# Patient Record
Sex: MALE | Race: WHITE | NOT HISPANIC OR LATINO | Employment: OTHER | URBAN - NONMETROPOLITAN AREA
[De-identification: names, ages, dates, MRNs, and addresses within clinical notes are randomized per-mention and may not be internally consistent; named-entity substitution may affect disease eponyms.]

---

## 2017-02-16 ENCOUNTER — HOSPITAL ENCOUNTER (OUTPATIENT)
Dept: ULTRASOUND IMAGING | Facility: HOSPITAL | Age: 62
Discharge: HOME | End: 2017-02-16
Admitting: NURSE PRACTITIONER

## 2017-02-16 DIAGNOSIS — R60.0 LOCALIZED EDEMA: ICD-10-CM

## 2017-02-16 PROCEDURE — 93971 EXTREMITY STUDY: CPT

## 2017-02-20 RX ORDER — ROSUVASTATIN CALCIUM 10 MG/1
10 TABLET, COATED ORAL DAILY
Qty: 30 TABLET | Refills: 11 | Status: SHIPPED | OUTPATIENT
Start: 2017-02-20 | End: 2017-04-20 | Stop reason: SDUPTHER

## 2017-02-21 RX ORDER — FUROSEMIDE 20 MG/1
TABLET ORAL
Qty: 30 TABLET | Refills: 3 | Status: SHIPPED | OUTPATIENT
Start: 2017-02-21 | End: 2017-04-20 | Stop reason: SDUPTHER

## 2017-04-20 RX ORDER — SPIRONOLACTONE 50 MG/1
50 TABLET, FILM COATED ORAL DAILY
Qty: 30 TABLET | Refills: 6 | Status: SHIPPED | OUTPATIENT
Start: 2017-04-20 | End: 2017-05-24

## 2017-04-20 RX ORDER — ISOSORBIDE MONONITRATE 30 MG/1
30 TABLET, EXTENDED RELEASE ORAL DAILY
Qty: 30 TABLET | Refills: 6 | Status: SHIPPED | OUTPATIENT
Start: 2017-04-20 | End: 2018-06-25 | Stop reason: SDUPTHER

## 2017-04-20 RX ORDER — ROSUVASTATIN CALCIUM 10 MG/1
10 TABLET, COATED ORAL DAILY
Qty: 30 TABLET | Refills: 11 | Status: SHIPPED | OUTPATIENT
Start: 2017-04-20 | End: 2017-10-30

## 2017-04-20 RX ORDER — CLOPIDOGREL BISULFATE 75 MG/1
75 TABLET ORAL DAILY
Qty: 30 TABLET | Refills: 6 | Status: SHIPPED | OUTPATIENT
Start: 2017-04-20 | End: 2017-10-30 | Stop reason: SDUPTHER

## 2017-04-20 RX ORDER — ISOSORBIDE MONONITRATE 30 MG/1
TABLET, EXTENDED RELEASE ORAL
Qty: 30 TABLET | Refills: 6 | Status: SHIPPED | OUTPATIENT
Start: 2017-04-20 | End: 2017-04-20

## 2017-04-20 RX ORDER — LOSARTAN POTASSIUM AND HYDROCHLOROTHIAZIDE 25; 100 MG/1; MG/1
TABLET ORAL
Qty: 30 TABLET | Refills: 6 | Status: SHIPPED | OUTPATIENT
Start: 2017-04-20 | End: 2017-04-20 | Stop reason: SDUPTHER

## 2017-04-20 RX ORDER — CARVEDILOL 12.5 MG/1
12.5 TABLET ORAL 2 TIMES DAILY
Qty: 60 TABLET | Refills: 6 | Status: SHIPPED | OUTPATIENT
Start: 2017-04-20 | End: 2017-05-16 | Stop reason: HOSPADM

## 2017-04-20 RX ORDER — FUROSEMIDE 20 MG/1
20 TABLET ORAL DAILY
Qty: 30 TABLET | Refills: 6 | Status: SHIPPED | OUTPATIENT
Start: 2017-04-20 | End: 2018-06-25 | Stop reason: SDUPTHER

## 2017-04-20 RX ORDER — LOSARTAN POTASSIUM AND HYDROCHLOROTHIAZIDE 25; 100 MG/1; MG/1
1 TABLET ORAL DAILY
Qty: 30 TABLET | Refills: 6 | Status: SHIPPED | OUTPATIENT
Start: 2017-04-20 | End: 2018-06-25 | Stop reason: SDUPTHER

## 2017-04-20 NOTE — TELEPHONE ENCOUNTER
Refill Request came in by fax from   [facility] - [place], [place] - [address] - [phone]  - [phone] [facility] [address]  [place] [address]  Phone: [phone] Fax: [phone]   Refill sent to pharmacy via e-scribe.

## 2017-05-13 ENCOUNTER — APPOINTMENT (OUTPATIENT)
Dept: GENERAL RADIOLOGY | Facility: HOSPITAL | Age: 62
End: 2017-05-13

## 2017-05-13 ENCOUNTER — HOSPITAL ENCOUNTER (INPATIENT)
Facility: HOSPITAL | Age: 62
LOS: 3 days | Discharge: HOME | End: 2017-05-16
Attending: EMERGENCY MEDICINE | Admitting: INTERNAL MEDICINE

## 2017-05-13 DIAGNOSIS — R55 SYNCOPE, UNSPECIFIED SYNCOPE TYPE: ICD-10-CM

## 2017-05-13 DIAGNOSIS — N28.9 ACUTE RENAL INSUFFICIENCY: ICD-10-CM

## 2017-05-13 DIAGNOSIS — J18.9 PNEUMONIA OF RIGHT MIDDLE LOBE DUE TO INFECTIOUS ORGANISM: Primary | ICD-10-CM

## 2017-05-13 DIAGNOSIS — I95.9 HYPOTENSION, UNSPECIFIED HYPOTENSION TYPE: ICD-10-CM

## 2017-05-13 LAB
ALBUMIN SERPL-MCNC: 4 G/DL (ref 3.4–4.8)
ALBUMIN/GLOB SERPL: 1.3 G/DL (ref 1.1–1.8)
ALP SERPL-CCNC: 54 U/L (ref 38–126)
ALT SERPL W P-5'-P-CCNC: 37 U/L (ref 21–72)
ANION GAP SERPL CALCULATED.3IONS-SCNC: 13 MMOL/L (ref 5–15)
AST SERPL-CCNC: 30 U/L (ref 17–59)
BASOPHILS # BLD AUTO: 0.03 10*3/MM3 (ref 0–0.2)
BASOPHILS NFR BLD AUTO: 0.2 % (ref 0–2)
BILIRUB SERPL-MCNC: 1.5 MG/DL (ref 0.2–1.3)
BUN BLD-MCNC: 17 MG/DL (ref 7–21)
BUN/CREAT SERPL: 12.3 (ref 7–25)
CALCIUM SPEC-SCNC: 9.9 MG/DL (ref 8.4–10.2)
CHLORIDE SERPL-SCNC: 97 MMOL/L (ref 95–110)
CK MB SERPL-CCNC: 1.84 NG/ML (ref 0–5)
CK SERPL-CCNC: 216 U/L (ref 55–170)
CO2 SERPL-SCNC: 22 MMOL/L (ref 22–31)
CREAT BLD-MCNC: 1.38 MG/DL (ref 0.7–1.3)
D-LACTATE SERPL-SCNC: 3 MMOL/L (ref 0.5–2)
D-LACTATE SERPL-SCNC: 4.6 MMOL/L (ref 0.5–2)
DEPRECATED RDW RBC AUTO: 44.1 FL (ref 35.1–43.9)
EOSINOPHIL # BLD AUTO: 0.01 10*3/MM3 (ref 0–0.7)
EOSINOPHIL NFR BLD AUTO: 0.1 % (ref 0–7)
ERYTHROCYTE [DISTWIDTH] IN BLOOD BY AUTOMATED COUNT: 13.5 % (ref 11.5–14.5)
GFR SERPL CREATININE-BSD FRML MDRD: 52 ML/MIN/1.73 (ref 60–113)
GLOBULIN UR ELPH-MCNC: 3.1 GM/DL (ref 2.3–3.5)
GLUCOSE BLD-MCNC: 225 MG/DL (ref 60–100)
GLUCOSE BLDC GLUCOMTR-MCNC: 164 MG/DL (ref 70–130)
HCT VFR BLD AUTO: 37.5 % (ref 39–49)
HGB BLD-MCNC: 12.8 G/DL (ref 13.7–17.3)
HOLD SPECIMEN: NORMAL
HOLD SPECIMEN: NORMAL
IMM GRANULOCYTES # BLD: 0.06 10*3/MM3 (ref 0–0.02)
IMM GRANULOCYTES NFR BLD: 0.4 % (ref 0–0.5)
INR PPP: 1.28 (ref 0.8–1.2)
LYMPHOCYTES # BLD AUTO: 1.33 10*3/MM3 (ref 0.6–4.2)
LYMPHOCYTES NFR BLD AUTO: 8.8 % (ref 10–50)
MCH RBC QN AUTO: 30.6 PG (ref 26.5–34)
MCHC RBC AUTO-ENTMCNC: 34.1 G/DL (ref 31.5–36.3)
MCV RBC AUTO: 89.7 FL (ref 80–98)
MONOCYTES # BLD AUTO: 1.4 10*3/MM3 (ref 0–0.9)
MONOCYTES NFR BLD AUTO: 9.3 % (ref 0–12)
NEUTROPHILS # BLD AUTO: 12.28 10*3/MM3 (ref 2–8.6)
NEUTROPHILS NFR BLD AUTO: 81.2 % (ref 37–80)
NT-PROBNP SERPL-MCNC: 653 PG/ML (ref 0–900)
PLATELET # BLD AUTO: 159 10*3/MM3 (ref 150–450)
PMV BLD AUTO: 9 FL (ref 8–12)
POTASSIUM BLD-SCNC: 4.5 MMOL/L (ref 3.5–5.1)
PROT SERPL-MCNC: 7.1 G/DL (ref 6.3–8.6)
PROTHROMBIN TIME: 16 SECONDS (ref 11.1–15.3)
RBC # BLD AUTO: 4.18 10*6/MM3 (ref 4.37–5.74)
SODIUM BLD-SCNC: 132 MMOL/L (ref 137–145)
TROPONIN I SERPL-MCNC: 0.02 NG/ML
TROPONIN I SERPL-MCNC: 0.02 NG/ML
WBC NRBC COR # BLD: 15.11 10*3/MM3 (ref 3.2–9.8)
WHOLE BLOOD HOLD SPECIMEN: NORMAL
WHOLE BLOOD HOLD SPECIMEN: NORMAL

## 2017-05-13 PROCEDURE — 82962 GLUCOSE BLOOD TEST: CPT

## 2017-05-13 PROCEDURE — 83605 ASSAY OF LACTIC ACID: CPT | Performed by: INTERNAL MEDICINE

## 2017-05-13 PROCEDURE — 82550 ASSAY OF CK (CPK): CPT | Performed by: EMERGENCY MEDICINE

## 2017-05-13 PROCEDURE — 84484 ASSAY OF TROPONIN QUANT: CPT | Performed by: INTERNAL MEDICINE

## 2017-05-13 PROCEDURE — 63710000001 INSULIN ASPART PER 5 UNITS: Performed by: INTERNAL MEDICINE

## 2017-05-13 PROCEDURE — 87040 BLOOD CULTURE FOR BACTERIA: CPT | Performed by: EMERGENCY MEDICINE

## 2017-05-13 PROCEDURE — 71010 HC CHEST PA OR AP: CPT

## 2017-05-13 PROCEDURE — 25010000002 AZITHROMYCIN: Performed by: EMERGENCY MEDICINE

## 2017-05-13 PROCEDURE — 85610 PROTHROMBIN TIME: CPT | Performed by: EMERGENCY MEDICINE

## 2017-05-13 PROCEDURE — 93010 ELECTROCARDIOGRAM REPORT: CPT | Performed by: INTERNAL MEDICINE

## 2017-05-13 PROCEDURE — 82553 CREATINE MB FRACTION: CPT | Performed by: EMERGENCY MEDICINE

## 2017-05-13 PROCEDURE — 93005 ELECTROCARDIOGRAM TRACING: CPT | Performed by: EMERGENCY MEDICINE

## 2017-05-13 PROCEDURE — 99284 EMERGENCY DEPT VISIT MOD MDM: CPT

## 2017-05-13 PROCEDURE — 83605 ASSAY OF LACTIC ACID: CPT | Performed by: EMERGENCY MEDICINE

## 2017-05-13 PROCEDURE — 80053 COMPREHEN METABOLIC PANEL: CPT | Performed by: EMERGENCY MEDICINE

## 2017-05-13 PROCEDURE — 25010000002 CEFTRIAXONE: Performed by: EMERGENCY MEDICINE

## 2017-05-13 PROCEDURE — 83880 ASSAY OF NATRIURETIC PEPTIDE: CPT | Performed by: EMERGENCY MEDICINE

## 2017-05-13 PROCEDURE — 84484 ASSAY OF TROPONIN QUANT: CPT | Performed by: EMERGENCY MEDICINE

## 2017-05-13 PROCEDURE — 85025 COMPLETE CBC W/AUTO DIFF WBC: CPT | Performed by: EMERGENCY MEDICINE

## 2017-05-13 RX ORDER — CLOPIDOGREL BISULFATE 75 MG/1
75 TABLET ORAL DAILY
Status: DISCONTINUED | OUTPATIENT
Start: 2017-05-14 | End: 2017-05-16 | Stop reason: HOSPADM

## 2017-05-13 RX ORDER — ZOLPIDEM TARTRATE 5 MG/1
5 TABLET ORAL NIGHTLY PRN
Status: DISCONTINUED | OUTPATIENT
Start: 2017-05-13 | End: 2017-05-16 | Stop reason: HOSPADM

## 2017-05-13 RX ORDER — ACETAMINOPHEN 325 MG/1
650 TABLET ORAL EVERY 6 HOURS PRN
Status: DISCONTINUED | OUTPATIENT
Start: 2017-05-13 | End: 2017-05-16 | Stop reason: HOSPADM

## 2017-05-13 RX ORDER — ATORVASTATIN CALCIUM 20 MG/1
20 TABLET, FILM COATED ORAL DAILY
Status: DISCONTINUED | OUTPATIENT
Start: 2017-05-13 | End: 2017-05-16 | Stop reason: HOSPADM

## 2017-05-13 RX ORDER — ISOSORBIDE MONONITRATE 30 MG/1
30 TABLET, EXTENDED RELEASE ORAL DAILY
Status: DISCONTINUED | OUTPATIENT
Start: 2017-05-14 | End: 2017-05-16 | Stop reason: HOSPADM

## 2017-05-13 RX ORDER — FUROSEMIDE 20 MG/1
20 TABLET ORAL DAILY
Status: DISCONTINUED | OUTPATIENT
Start: 2017-05-13 | End: 2017-05-16 | Stop reason: HOSPADM

## 2017-05-13 RX ORDER — IPRATROPIUM BROMIDE AND ALBUTEROL SULFATE 2.5; .5 MG/3ML; MG/3ML
3 SOLUTION RESPIRATORY (INHALATION) EVERY 4 HOURS PRN
Status: DISCONTINUED | OUTPATIENT
Start: 2017-05-13 | End: 2017-05-16 | Stop reason: HOSPADM

## 2017-05-13 RX ORDER — SODIUM CHLORIDE 0.9 % (FLUSH) 0.9 %
10 SYRINGE (ML) INJECTION AS NEEDED
Status: DISCONTINUED | OUTPATIENT
Start: 2017-05-13 | End: 2017-05-16 | Stop reason: HOSPADM

## 2017-05-13 RX ORDER — SPIRONOLACTONE 50 MG/1
50 TABLET, FILM COATED ORAL DAILY
Status: DISCONTINUED | OUTPATIENT
Start: 2017-05-13 | End: 2017-05-14

## 2017-05-13 RX ORDER — DEXTROSE MONOHYDRATE 25 G/50ML
25 INJECTION, SOLUTION INTRAVENOUS
Status: DISCONTINUED | OUTPATIENT
Start: 2017-05-13 | End: 2017-05-16 | Stop reason: HOSPADM

## 2017-05-13 RX ORDER — ONDANSETRON 2 MG/ML
4 INJECTION INTRAMUSCULAR; INTRAVENOUS EVERY 6 HOURS PRN
Status: DISCONTINUED | OUTPATIENT
Start: 2017-05-13 | End: 2017-05-16 | Stop reason: HOSPADM

## 2017-05-13 RX ORDER — NICOTINE POLACRILEX 4 MG
15 LOZENGE BUCCAL
Status: DISCONTINUED | OUTPATIENT
Start: 2017-05-13 | End: 2017-05-16 | Stop reason: HOSPADM

## 2017-05-13 RX ORDER — CARVEDILOL 12.5 MG/1
12.5 TABLET ORAL 2 TIMES DAILY
Status: DISCONTINUED | OUTPATIENT
Start: 2017-05-13 | End: 2017-05-14

## 2017-05-13 RX ADMIN — AZITHROMYCIN 500 MG: 500 INJECTION, POWDER, LYOPHILIZED, FOR SOLUTION INTRAVENOUS at 15:05

## 2017-05-13 RX ADMIN — ZOLPIDEM TARTRATE 5 MG: 5 TABLET, FILM COATED ORAL at 22:02

## 2017-05-13 RX ADMIN — SODIUM CHLORIDE 1000 ML: 900 INJECTION, SOLUTION INTRAVENOUS at 10:16

## 2017-05-13 RX ADMIN — CEFTRIAXONE 1 G: 1 INJECTION, POWDER, FOR SOLUTION INTRAMUSCULAR; INTRAVENOUS at 10:45

## 2017-05-13 RX ADMIN — INSULIN ASPART 2 UNITS: 100 INJECTION, SOLUTION INTRAVENOUS; SUBCUTANEOUS at 20:47

## 2017-05-13 RX ADMIN — ATORVASTATIN CALCIUM 20 MG: 20 TABLET, FILM COATED ORAL at 17:45

## 2017-05-13 RX ADMIN — CARVEDILOL 12.5 MG: 12.5 TABLET, FILM COATED ORAL at 17:45

## 2017-05-13 RX ADMIN — SODIUM CHLORIDE 1000 ML: 9 INJECTION, SOLUTION INTRAVENOUS at 09:31

## 2017-05-13 RX ADMIN — SPIRONOLACTONE 50 MG: 50 TABLET, FILM COATED ORAL at 17:45

## 2017-05-13 RX ADMIN — FUROSEMIDE 20 MG: 20 TABLET ORAL at 17:45

## 2017-05-13 RX ADMIN — ACETAMINOPHEN 650 MG: 325 TABLET ORAL at 17:45

## 2017-05-14 ENCOUNTER — APPOINTMENT (OUTPATIENT)
Dept: CARDIOLOGY | Facility: HOSPITAL | Age: 62
End: 2017-05-14
Attending: INTERNAL MEDICINE

## 2017-05-14 ENCOUNTER — APPOINTMENT (OUTPATIENT)
Dept: MRI IMAGING | Facility: HOSPITAL | Age: 62
End: 2017-05-14

## 2017-05-14 LAB
ALBUMIN SERPL-MCNC: 3.7 G/DL (ref 3.4–4.8)
ALBUMIN/GLOB SERPL: 1.1 G/DL (ref 1.1–1.8)
ALP SERPL-CCNC: 46 U/L (ref 38–126)
ALT SERPL W P-5'-P-CCNC: 39 U/L (ref 21–72)
ANION GAP SERPL CALCULATED.3IONS-SCNC: 14 MMOL/L (ref 5–15)
AST SERPL-CCNC: 42 U/L (ref 17–59)
BACTERIA UR QL AUTO: ABNORMAL /HPF
BASOPHILS # BLD AUTO: 0.02 10*3/MM3 (ref 0–0.2)
BASOPHILS NFR BLD AUTO: 0.2 % (ref 0–2)
BH CV ECHO MEAS - ACS: 2.4 CM
BH CV ECHO MEAS - AO MAX PG (FULL): 3.6 MMHG
BH CV ECHO MEAS - AO MAX PG: 9.5 MMHG
BH CV ECHO MEAS - AO MEAN PG (FULL): 2.7 MMHG
BH CV ECHO MEAS - AO MEAN PG: 6 MMHG
BH CV ECHO MEAS - AO ROOT AREA: 12.5 CM^2
BH CV ECHO MEAS - AO ROOT DIAM: 4 CM
BH CV ECHO MEAS - AO V2 MAX: 154.2 CM/SEC
BH CV ECHO MEAS - AO V2 MEAN: 116.8 CM/SEC
BH CV ECHO MEAS - AO V2 VTI: 24.7 CM
BH CV ECHO MEAS - AVA(I,A): 5.7 CM^2
BH CV ECHO MEAS - AVA(I,D): 5.7 CM^2
BH CV ECHO MEAS - AVA(V,A): 5.8 CM^2
BH CV ECHO MEAS - AVA(V,D): 5.8 CM^2
BH CV ECHO MEAS - EDV(CUBED): 171.3 ML
BH CV ECHO MEAS - EDV(TEICH): 150.7 ML
BH CV ECHO MEAS - EF(CUBED): 70.2 %
BH CV ECHO MEAS - EF(TEICH): 61.2 %
BH CV ECHO MEAS - ESV(CUBED): 51 ML
BH CV ECHO MEAS - ESV(TEICH): 58.5 ML
BH CV ECHO MEAS - FS: 33.2 %
BH CV ECHO MEAS - IVS/LVPW: 1
BH CV ECHO MEAS - IVSD: 1.2 CM
BH CV ECHO MEAS - LA DIMENSION: 4.1 CM
BH CV ECHO MEAS - LA/AO: 1
BH CV ECHO MEAS - LV MASS(C)D: 280.2 GRAMS
BH CV ECHO MEAS - LV MAX PG: 5.9 MMHG
BH CV ECHO MEAS - LV MEAN PG: 3.3 MMHG
BH CV ECHO MEAS - LV V1 MAX: 121.7 CM/SEC
BH CV ECHO MEAS - LV V1 MEAN: 83.4 CM/SEC
BH CV ECHO MEAS - LV V1 VTI: 19.4 CM
BH CV ECHO MEAS - LVIDD: 5.6 CM
BH CV ECHO MEAS - LVIDS: 3.7 CM
BH CV ECHO MEAS - LVOT AREA (M): 7.1 CM^2
BH CV ECHO MEAS - LVOT AREA: 7.3 CM^2
BH CV ECHO MEAS - LVOT DIAM: 3 CM
BH CV ECHO MEAS - LVPWD: 1.2 CM
BH CV ECHO MEAS - MV A MAX VEL: 68.6 CM/SEC
BH CV ECHO MEAS - MV DEC SLOPE: 550.8 CM/SEC^2
BH CV ECHO MEAS - MV E MAX VEL: 87.4 CM/SEC
BH CV ECHO MEAS - MV E/A: 1.3
BH CV ECHO MEAS - MV P1/2T MAX VEL: 105.2 CM/SEC
BH CV ECHO MEAS - MV P1/2T: 55.9 MSEC
BH CV ECHO MEAS - MVA P1/2T LCG: 2.1 CM^2
BH CV ECHO MEAS - MVA(P1/2T): 3.9 CM^2
BH CV ECHO MEAS - PA MAX PG: 3.8 MMHG
BH CV ECHO MEAS - PA V2 MAX: 97.9 CM/SEC
BH CV ECHO MEAS - RAP SYSTOLE: 10 MMHG
BH CV ECHO MEAS - RVDD: 2.7 CM
BH CV ECHO MEAS - SV(AO): 309.7 ML
BH CV ECHO MEAS - SV(CUBED): 120.3 ML
BH CV ECHO MEAS - SV(LVOT): 141.4 ML
BH CV ECHO MEAS - SV(TEICH): 92.3 ML
BILIRUB SERPL-MCNC: 1.3 MG/DL (ref 0.2–1.3)
BILIRUB UR QL STRIP: ABNORMAL
BUN BLD-MCNC: 25 MG/DL (ref 7–21)
BUN/CREAT SERPL: 17.1 (ref 7–25)
CALCIUM SPEC-SCNC: 9.2 MG/DL (ref 8.4–10.2)
CHLORIDE SERPL-SCNC: 100 MMOL/L (ref 95–110)
CLARITY UR: CLEAR
CO2 SERPL-SCNC: 20 MMOL/L (ref 22–31)
COARSE GRAN CASTS URNS QL MICRO: ABNORMAL /LPF
COLOR UR: ABNORMAL
CREAT BLD-MCNC: 1.46 MG/DL (ref 0.7–1.3)
D-LACTATE SERPL-SCNC: 2.9 MMOL/L (ref 0.5–2)
DEPRECATED RDW RBC AUTO: 44 FL (ref 35.1–43.9)
EOSINOPHIL # BLD AUTO: 0 10*3/MM3 (ref 0–0.7)
EOSINOPHIL NFR BLD AUTO: 0 % (ref 0–7)
ERYTHROCYTE [DISTWIDTH] IN BLOOD BY AUTOMATED COUNT: 13.5 % (ref 11.5–14.5)
GFR SERPL CREATININE-BSD FRML MDRD: 49 ML/MIN/1.73 (ref 60–113)
GLOBULIN UR ELPH-MCNC: 3.3 GM/DL (ref 2.3–3.5)
GLUCOSE BLD-MCNC: 169 MG/DL (ref 60–100)
GLUCOSE BLDC GLUCOMTR-MCNC: 195 MG/DL (ref 70–130)
GLUCOSE BLDC GLUCOMTR-MCNC: 203 MG/DL (ref 70–130)
GLUCOSE BLDC GLUCOMTR-MCNC: 205 MG/DL (ref 70–130)
GLUCOSE UR STRIP-MCNC: NEGATIVE MG/DL
HCT VFR BLD AUTO: 37.7 % (ref 39–49)
HGB BLD-MCNC: 12.8 G/DL (ref 13.7–17.3)
HGB UR QL STRIP.AUTO: ABNORMAL
HYALINE CASTS UR QL AUTO: ABNORMAL /LPF
IMM GRANULOCYTES # BLD: 0.06 10*3/MM3 (ref 0–0.02)
IMM GRANULOCYTES NFR BLD: 0.5 % (ref 0–0.5)
KETONES UR QL STRIP: ABNORMAL
LEUKOCYTE ESTERASE UR QL STRIP.AUTO: ABNORMAL
LV EF 2D ECHO EST: 57 %
LYMPHOCYTES # BLD AUTO: 1.04 10*3/MM3 (ref 0.6–4.2)
LYMPHOCYTES NFR BLD AUTO: 8.9 % (ref 10–50)
MCH RBC QN AUTO: 30.6 PG (ref 26.5–34)
MCHC RBC AUTO-ENTMCNC: 34 G/DL (ref 31.5–36.3)
MCV RBC AUTO: 90.2 FL (ref 80–98)
MONOCYTES # BLD AUTO: 0.67 10*3/MM3 (ref 0–0.9)
MONOCYTES NFR BLD AUTO: 5.7 % (ref 0–12)
NEUTROPHILS # BLD AUTO: 9.91 10*3/MM3 (ref 2–8.6)
NEUTROPHILS NFR BLD AUTO: 84.7 % (ref 37–80)
NITRITE UR QL STRIP: NEGATIVE
PH UR STRIP.AUTO: 5.5 [PH] (ref 5–9)
PLATELET # BLD AUTO: 138 10*3/MM3 (ref 150–450)
PMV BLD AUTO: 9.9 FL (ref 8–12)
POTASSIUM BLD-SCNC: 4.1 MMOL/L (ref 3.5–5.1)
PROT SERPL-MCNC: 7 G/DL (ref 6.3–8.6)
PROT UR QL STRIP: ABNORMAL
RBC # BLD AUTO: 4.18 10*6/MM3 (ref 4.37–5.74)
RBC # UR: ABNORMAL /HPF
REF LAB TEST METHOD: ABNORMAL
SODIUM BLD-SCNC: 134 MMOL/L (ref 137–145)
SP GR UR STRIP: 1.02 (ref 1–1.03)
SQUAMOUS #/AREA URNS HPF: ABNORMAL /HPF
TROPONIN I SERPL-MCNC: 0.02 NG/ML
TROPONIN I SERPL-MCNC: 0.02 NG/ML
TROPONIN I SERPL-MCNC: 0.04 NG/ML
UROBILINOGEN UR QL STRIP: ABNORMAL
WBC CASTS #/AREA URNS LPF: ABNORMAL /LPF
WBC NRBC COR # BLD: 11.7 10*3/MM3 (ref 3.2–9.8)
WBC UR QL AUTO: ABNORMAL /HPF

## 2017-05-14 PROCEDURE — 83605 ASSAY OF LACTIC ACID: CPT | Performed by: NURSE PRACTITIONER

## 2017-05-14 PROCEDURE — 85025 COMPLETE CBC W/AUTO DIFF WBC: CPT | Performed by: INTERNAL MEDICINE

## 2017-05-14 PROCEDURE — 63710000001 INSULIN ASPART PER 5 UNITS: Performed by: INTERNAL MEDICINE

## 2017-05-14 PROCEDURE — 93306 TTE W/DOPPLER COMPLETE: CPT

## 2017-05-14 PROCEDURE — 81001 URINALYSIS AUTO W/SCOPE: CPT | Performed by: EMERGENCY MEDICINE

## 2017-05-14 PROCEDURE — 87086 URINE CULTURE/COLONY COUNT: CPT | Performed by: EMERGENCY MEDICINE

## 2017-05-14 PROCEDURE — 99253 IP/OBS CNSLTJ NEW/EST LOW 45: CPT | Performed by: INTERNAL MEDICINE

## 2017-05-14 PROCEDURE — 25010000002 AZITHROMYCIN: Performed by: INTERNAL MEDICINE

## 2017-05-14 PROCEDURE — 82962 GLUCOSE BLOOD TEST: CPT

## 2017-05-14 PROCEDURE — 93306 TTE W/DOPPLER COMPLETE: CPT | Performed by: INTERNAL MEDICINE

## 2017-05-14 PROCEDURE — 80053 COMPREHEN METABOLIC PANEL: CPT | Performed by: INTERNAL MEDICINE

## 2017-05-14 PROCEDURE — 25010000002 CEFTRIAXONE: Performed by: INTERNAL MEDICINE

## 2017-05-14 PROCEDURE — 84484 ASSAY OF TROPONIN QUANT: CPT | Performed by: INTERNAL MEDICINE

## 2017-05-14 RX ORDER — CARVEDILOL 6.25 MG/1
6.25 TABLET ORAL 2 TIMES DAILY
Status: DISCONTINUED | OUTPATIENT
Start: 2017-05-14 | End: 2017-05-16 | Stop reason: HOSPADM

## 2017-05-14 RX ADMIN — INSULIN ASPART 2 UNITS: 100 INJECTION, SOLUTION INTRAVENOUS; SUBCUTANEOUS at 17:30

## 2017-05-14 RX ADMIN — ISOSORBIDE MONONITRATE 30 MG: 30 TABLET, EXTENDED RELEASE ORAL at 08:41

## 2017-05-14 RX ADMIN — ATORVASTATIN CALCIUM 20 MG: 20 TABLET, FILM COATED ORAL at 10:18

## 2017-05-14 RX ADMIN — ZOLPIDEM TARTRATE 5 MG: 5 TABLET, FILM COATED ORAL at 20:24

## 2017-05-14 RX ADMIN — Medication 10 ML: at 20:29

## 2017-05-14 RX ADMIN — ACETAMINOPHEN 650 MG: 325 TABLET ORAL at 08:41

## 2017-05-14 RX ADMIN — CEFTRIAXONE 1 G: 1 INJECTION, POWDER, FOR SOLUTION INTRAMUSCULAR; INTRAVENOUS at 13:31

## 2017-05-14 RX ADMIN — CLOPIDOGREL BISULFATE 75 MG: 75 TABLET ORAL at 08:41

## 2017-05-14 RX ADMIN — AZITHROMYCIN 500 MG: 500 INJECTION, POWDER, LYOPHILIZED, FOR SOLUTION INTRAVENOUS at 15:35

## 2017-05-14 RX ADMIN — INSULIN ASPART 2 UNITS: 100 INJECTION, SOLUTION INTRAVENOUS; SUBCUTANEOUS at 08:43

## 2017-05-14 RX ADMIN — INSULIN ASPART 2 UNITS: 100 INJECTION, SOLUTION INTRAVENOUS; SUBCUTANEOUS at 12:00

## 2017-05-14 RX ADMIN — CARVEDILOL 6.25 MG: 12.5 TABLET, FILM COATED ORAL at 17:30

## 2017-05-14 RX ADMIN — CARVEDILOL 6.25 MG: 12.5 TABLET, FILM COATED ORAL at 08:41

## 2017-05-14 RX ADMIN — INSULIN ASPART 3 UNITS: 100 INJECTION, SOLUTION INTRAVENOUS; SUBCUTANEOUS at 20:24

## 2017-05-14 RX ADMIN — FUROSEMIDE 20 MG: 20 TABLET ORAL at 08:41

## 2017-05-15 LAB
ALBUMIN SERPL-MCNC: 3.4 G/DL (ref 3.4–4.8)
ALBUMIN/GLOB SERPL: 1 G/DL (ref 1.1–1.8)
ALP SERPL-CCNC: 56 U/L (ref 38–126)
ALT SERPL W P-5'-P-CCNC: 45 U/L (ref 21–72)
ANION GAP SERPL CALCULATED.3IONS-SCNC: 13 MMOL/L (ref 5–15)
AST SERPL-CCNC: 57 U/L (ref 17–59)
BASOPHILS # BLD AUTO: 0.03 10*3/MM3 (ref 0–0.2)
BASOPHILS NFR BLD AUTO: 0.3 % (ref 0–2)
BILIRUB SERPL-MCNC: 0.8 MG/DL (ref 0.2–1.3)
BUN BLD-MCNC: 26 MG/DL (ref 7–21)
BUN/CREAT SERPL: 21.5 (ref 7–25)
CALCIUM SPEC-SCNC: 9.3 MG/DL (ref 8.4–10.2)
CHLORIDE SERPL-SCNC: 101 MMOL/L (ref 95–110)
CO2 SERPL-SCNC: 23 MMOL/L (ref 22–31)
CREAT BLD-MCNC: 1.21 MG/DL (ref 0.7–1.3)
DEPRECATED RDW RBC AUTO: 45.7 FL (ref 35.1–43.9)
EOSINOPHIL # BLD AUTO: 0.01 10*3/MM3 (ref 0–0.7)
EOSINOPHIL NFR BLD AUTO: 0.1 % (ref 0–7)
ERYTHROCYTE [DISTWIDTH] IN BLOOD BY AUTOMATED COUNT: 13.9 % (ref 11.5–14.5)
GFR SERPL CREATININE-BSD FRML MDRD: 61 ML/MIN/1.73
GLOBULIN UR ELPH-MCNC: 3.3 GM/DL (ref 2.3–3.5)
GLUCOSE BLD-MCNC: 159 MG/DL (ref 60–100)
GLUCOSE BLDC GLUCOMTR-MCNC: 145 MG/DL (ref 70–130)
GLUCOSE BLDC GLUCOMTR-MCNC: 155 MG/DL (ref 70–130)
GLUCOSE BLDC GLUCOMTR-MCNC: 168 MG/DL (ref 70–130)
GLUCOSE BLDC GLUCOMTR-MCNC: 181 MG/DL (ref 70–130)
GLUCOSE BLDC GLUCOMTR-MCNC: 192 MG/DL (ref 70–130)
HCT VFR BLD AUTO: 36.9 % (ref 39–49)
HGB BLD-MCNC: 12.5 G/DL (ref 13.7–17.3)
IMM GRANULOCYTES # BLD: 0.04 10*3/MM3 (ref 0–0.02)
IMM GRANULOCYTES NFR BLD: 0.4 % (ref 0–0.5)
LYMPHOCYTES # BLD AUTO: 0.92 10*3/MM3 (ref 0.6–4.2)
LYMPHOCYTES NFR BLD AUTO: 9.1 % (ref 10–50)
MCH RBC QN AUTO: 30.6 PG (ref 26.5–34)
MCHC RBC AUTO-ENTMCNC: 33.9 G/DL (ref 31.5–36.3)
MCV RBC AUTO: 90.2 FL (ref 80–98)
MONOCYTES # BLD AUTO: 0.72 10*3/MM3 (ref 0–0.9)
MONOCYTES NFR BLD AUTO: 7.2 % (ref 0–12)
NEUTROPHILS # BLD AUTO: 8.34 10*3/MM3 (ref 2–8.6)
NEUTROPHILS NFR BLD AUTO: 82.9 % (ref 37–80)
NRBC BLD MANUAL-RTO: 0 /100 WBC (ref 0–0)
PLATELET # BLD AUTO: 147 10*3/MM3 (ref 150–450)
PMV BLD AUTO: 9.6 FL (ref 8–12)
POTASSIUM BLD-SCNC: 4 MMOL/L (ref 3.5–5.1)
PROT SERPL-MCNC: 6.7 G/DL (ref 6.3–8.6)
RBC # BLD AUTO: 4.09 10*6/MM3 (ref 4.37–5.74)
SODIUM BLD-SCNC: 137 MMOL/L (ref 137–145)
WBC NRBC COR # BLD: 10.06 10*3/MM3 (ref 3.2–9.8)

## 2017-05-15 PROCEDURE — 25010000002 AZITHROMYCIN: Performed by: INTERNAL MEDICINE

## 2017-05-15 PROCEDURE — 80053 COMPREHEN METABOLIC PANEL: CPT | Performed by: INTERNAL MEDICINE

## 2017-05-15 PROCEDURE — 63710000001 INSULIN ASPART PER 5 UNITS: Performed by: INTERNAL MEDICINE

## 2017-05-15 PROCEDURE — 25010000002 CEFTRIAXONE: Performed by: INTERNAL MEDICINE

## 2017-05-15 PROCEDURE — 82962 GLUCOSE BLOOD TEST: CPT

## 2017-05-15 PROCEDURE — 85025 COMPLETE CBC W/AUTO DIFF WBC: CPT | Performed by: INTERNAL MEDICINE

## 2017-05-15 RX ADMIN — AZITHROMYCIN 500 MG: 500 INJECTION, POWDER, LYOPHILIZED, FOR SOLUTION INTRAVENOUS at 15:23

## 2017-05-15 RX ADMIN — INSULIN ASPART 2 UNITS: 100 INJECTION, SOLUTION INTRAVENOUS; SUBCUTANEOUS at 18:22

## 2017-05-15 RX ADMIN — Medication 10 ML: at 11:23

## 2017-05-15 RX ADMIN — CLOPIDOGREL BISULFATE 75 MG: 75 TABLET ORAL at 08:47

## 2017-05-15 RX ADMIN — CEFTRIAXONE 1 G: 1 INJECTION, POWDER, FOR SOLUTION INTRAMUSCULAR; INTRAVENOUS at 11:23

## 2017-05-15 RX ADMIN — ZOLPIDEM TARTRATE 5 MG: 5 TABLET, FILM COATED ORAL at 20:27

## 2017-05-15 RX ADMIN — ATORVASTATIN CALCIUM 20 MG: 20 TABLET, FILM COATED ORAL at 08:47

## 2017-05-15 RX ADMIN — Medication 10 ML: at 12:51

## 2017-05-15 RX ADMIN — CARVEDILOL 6.25 MG: 12.5 TABLET, FILM COATED ORAL at 18:22

## 2017-05-15 RX ADMIN — INSULIN ASPART 2 UNITS: 100 INJECTION, SOLUTION INTRAVENOUS; SUBCUTANEOUS at 08:48

## 2017-05-15 RX ADMIN — CARVEDILOL 6.25 MG: 12.5 TABLET, FILM COATED ORAL at 08:47

## 2017-05-15 RX ADMIN — ISOSORBIDE MONONITRATE 30 MG: 30 TABLET, EXTENDED RELEASE ORAL at 08:47

## 2017-05-15 RX ADMIN — FUROSEMIDE 20 MG: 20 TABLET ORAL at 08:47

## 2017-05-15 RX ADMIN — INSULIN ASPART 2 UNITS: 100 INJECTION, SOLUTION INTRAVENOUS; SUBCUTANEOUS at 11:55

## 2017-05-16 VITALS
BODY MASS INDEX: 42.66 KG/M2 | WEIGHT: 315 LBS | HEART RATE: 75 BPM | DIASTOLIC BLOOD PRESSURE: 55 MMHG | RESPIRATION RATE: 18 BRPM | SYSTOLIC BLOOD PRESSURE: 106 MMHG | OXYGEN SATURATION: 92 % | TEMPERATURE: 98.8 F | HEIGHT: 72 IN

## 2017-05-16 LAB
ALBUMIN SERPL-MCNC: 3.4 G/DL (ref 3.4–4.8)
ALBUMIN/GLOB SERPL: 1 G/DL (ref 1.1–1.8)
ALP SERPL-CCNC: 71 U/L (ref 38–126)
ALT SERPL W P-5'-P-CCNC: 61 U/L (ref 21–72)
ANION GAP SERPL CALCULATED.3IONS-SCNC: 13 MMOL/L (ref 5–15)
AST SERPL-CCNC: 78 U/L (ref 17–59)
BACTERIA SPEC AEROBE CULT: NORMAL
BASOPHILS # BLD AUTO: 0.02 10*3/MM3 (ref 0–0.2)
BASOPHILS NFR BLD AUTO: 0.2 % (ref 0–2)
BILIRUB SERPL-MCNC: 1 MG/DL (ref 0.2–1.3)
BUN BLD-MCNC: 22 MG/DL (ref 7–21)
BUN/CREAT SERPL: 22.4 (ref 7–25)
CALCIUM SPEC-SCNC: 9.8 MG/DL (ref 8.4–10.2)
CHLORIDE SERPL-SCNC: 100 MMOL/L (ref 95–110)
CO2 SERPL-SCNC: 23 MMOL/L (ref 22–31)
CREAT BLD-MCNC: 0.98 MG/DL (ref 0.7–1.3)
DEPRECATED RDW RBC AUTO: 44.8 FL (ref 35.1–43.9)
EOSINOPHIL # BLD AUTO: 0.08 10*3/MM3 (ref 0–0.7)
EOSINOPHIL NFR BLD AUTO: 0.9 % (ref 0–7)
ERYTHROCYTE [DISTWIDTH] IN BLOOD BY AUTOMATED COUNT: 13.5 % (ref 11.5–14.5)
GFR SERPL CREATININE-BSD FRML MDRD: 77 ML/MIN/1.73 (ref 49–113)
GLOBULIN UR ELPH-MCNC: 3.5 GM/DL (ref 2.3–3.5)
GLUCOSE BLD-MCNC: 185 MG/DL (ref 60–100)
GLUCOSE BLDC GLUCOMTR-MCNC: 169 MG/DL (ref 70–130)
GLUCOSE BLDC GLUCOMTR-MCNC: 175 MG/DL (ref 70–130)
HCT VFR BLD AUTO: 36.3 % (ref 39–49)
HGB BLD-MCNC: 12.7 G/DL (ref 13.7–17.3)
IMM GRANULOCYTES # BLD: 0.05 10*3/MM3 (ref 0–0.02)
IMM GRANULOCYTES NFR BLD: 0.6 % (ref 0–0.5)
LYMPHOCYTES # BLD AUTO: 0.95 10*3/MM3 (ref 0.6–4.2)
LYMPHOCYTES NFR BLD AUTO: 10.7 % (ref 10–50)
MCH RBC QN AUTO: 31.5 PG (ref 26.5–34)
MCHC RBC AUTO-ENTMCNC: 35 G/DL (ref 31.5–36.3)
MCV RBC AUTO: 90.1 FL (ref 80–98)
MONOCYTES # BLD AUTO: 0.99 10*3/MM3 (ref 0–0.9)
MONOCYTES NFR BLD AUTO: 11.1 % (ref 0–12)
NEUTROPHILS # BLD AUTO: 6.79 10*3/MM3 (ref 2–8.6)
NEUTROPHILS NFR BLD AUTO: 76.5 % (ref 37–80)
PLATELET # BLD AUTO: 149 10*3/MM3 (ref 150–450)
PMV BLD AUTO: 9.9 FL (ref 8–12)
POTASSIUM BLD-SCNC: 3.9 MMOL/L (ref 3.5–5.1)
PROT SERPL-MCNC: 6.9 G/DL (ref 6.3–8.6)
RBC # BLD AUTO: 4.03 10*6/MM3 (ref 4.37–5.74)
SODIUM BLD-SCNC: 136 MMOL/L (ref 137–145)
WBC NRBC COR # BLD: 8.88 10*3/MM3 (ref 3.2–9.8)

## 2017-05-16 PROCEDURE — 85025 COMPLETE CBC W/AUTO DIFF WBC: CPT | Performed by: INTERNAL MEDICINE

## 2017-05-16 PROCEDURE — 82962 GLUCOSE BLOOD TEST: CPT

## 2017-05-16 PROCEDURE — 63710000001 INSULIN ASPART PER 5 UNITS: Performed by: INTERNAL MEDICINE

## 2017-05-16 PROCEDURE — 25010000002 CEFTRIAXONE: Performed by: INTERNAL MEDICINE

## 2017-05-16 PROCEDURE — 80053 COMPREHEN METABOLIC PANEL: CPT | Performed by: INTERNAL MEDICINE

## 2017-05-16 RX ORDER — AZITHROMYCIN 250 MG/1
TABLET, FILM COATED ORAL
Qty: 2 TABLET | Refills: 0 | Status: SHIPPED | OUTPATIENT
Start: 2017-05-16 | End: 2017-08-09

## 2017-05-16 RX ORDER — CEFUROXIME AXETIL 500 MG/1
500 TABLET ORAL 2 TIMES DAILY
Qty: 12 TABLET | Refills: 0 | Status: SHIPPED | OUTPATIENT
Start: 2017-05-16 | End: 2017-08-09

## 2017-05-16 RX ORDER — CARVEDILOL 6.25 MG/1
6.25 TABLET ORAL 2 TIMES DAILY
Qty: 30 TABLET | Refills: 0 | Status: SHIPPED | OUTPATIENT
Start: 2017-05-16 | End: 2017-05-24 | Stop reason: SDUPTHER

## 2017-05-16 RX ADMIN — ISOSORBIDE MONONITRATE 30 MG: 30 TABLET, EXTENDED RELEASE ORAL at 08:25

## 2017-05-16 RX ADMIN — Medication 10 ML: at 08:25

## 2017-05-16 RX ADMIN — INSULIN ASPART 2 UNITS: 100 INJECTION, SOLUTION INTRAVENOUS; SUBCUTANEOUS at 08:23

## 2017-05-16 RX ADMIN — ATORVASTATIN CALCIUM 20 MG: 20 TABLET, FILM COATED ORAL at 08:25

## 2017-05-16 RX ADMIN — CEFTRIAXONE 1 G: 1 INJECTION, POWDER, FOR SOLUTION INTRAMUSCULAR; INTRAVENOUS at 11:25

## 2017-05-16 RX ADMIN — CARVEDILOL 6.25 MG: 12.5 TABLET, FILM COATED ORAL at 08:25

## 2017-05-16 RX ADMIN — FUROSEMIDE 20 MG: 20 TABLET ORAL at 08:25

## 2017-05-16 RX ADMIN — INSULIN ASPART 2 UNITS: 100 INJECTION, SOLUTION INTRAVENOUS; SUBCUTANEOUS at 11:25

## 2017-05-16 RX ADMIN — CLOPIDOGREL BISULFATE 75 MG: 75 TABLET ORAL at 08:25

## 2017-05-16 RX ADMIN — Medication 10 ML: at 11:25

## 2017-05-18 LAB
BACTERIA SPEC AEROBE CULT: NORMAL
BACTERIA SPEC AEROBE CULT: NORMAL

## 2017-05-24 RX ORDER — CARVEDILOL 6.25 MG/1
6.25 TABLET ORAL DAILY
Qty: 30 TABLET | Refills: 0 | Status: SHIPPED | OUTPATIENT
Start: 2017-05-24 | End: 2017-07-27 | Stop reason: SDUPTHER

## 2017-07-27 RX ORDER — CARVEDILOL 6.25 MG/1
6.25 TABLET ORAL DAILY
Qty: 30 TABLET | Refills: 6 | Status: SHIPPED | OUTPATIENT
Start: 2017-07-27 | End: 2018-02-26 | Stop reason: SDUPTHER

## 2017-08-09 ENCOUNTER — OFFICE VISIT (OUTPATIENT)
Dept: CARDIOLOGY | Facility: CLINIC | Age: 62
End: 2017-08-09

## 2017-08-09 VITALS
DIASTOLIC BLOOD PRESSURE: 64 MMHG | HEIGHT: 72 IN | HEART RATE: 67 BPM | SYSTOLIC BLOOD PRESSURE: 138 MMHG | BODY MASS INDEX: 42.66 KG/M2 | WEIGHT: 315 LBS

## 2017-08-09 DIAGNOSIS — E78.2 MIXED HYPERLIPIDEMIA: ICD-10-CM

## 2017-08-09 DIAGNOSIS — I25.10 ATHEROSCLEROSIS OF NATIVE CORONARY ARTERY OF NATIVE HEART WITHOUT ANGINA PECTORIS: Primary | ICD-10-CM

## 2017-08-09 DIAGNOSIS — I25.2 OLD MYOCARDIAL INFARCTION: ICD-10-CM

## 2017-08-09 DIAGNOSIS — I10 ESSENTIAL HYPERTENSION: ICD-10-CM

## 2017-08-09 PROCEDURE — 99213 OFFICE O/P EST LOW 20 MIN: CPT | Performed by: INTERNAL MEDICINE

## 2017-08-09 RX ORDER — SPIRONOLACTONE 25 MG/1
25 TABLET ORAL DAILY
Qty: 30 TABLET | Refills: 6 | Status: SHIPPED | OUTPATIENT
Start: 2017-08-09 | End: 2018-03-26 | Stop reason: SDUPTHER

## 2017-08-09 RX ORDER — ASPIRIN 325 MG
325 TABLET ORAL DAILY
COMMUNITY

## 2017-08-09 NOTE — PROGRESS NOTES
Aramis Alvarez  61 y.o. male    08/09/2017  1. Atherosclerosis of native coronary artery of native heart without angina pectoris    2. Old myocardial infarction    3. Essential hypertension    4. Mixed hyperlipidemia        History of Present Illness    Mr. Alvarez is here for follow-up of his above stated problems.  He denied any chest pain, palpitation, dyspnea.  His predominant complaint was swelling of the lower extremities which has been getting worse since his hospital admission earlier this year.  He feels that he was taken off Spironolactone which has helped him in the past.  Blood pressure was in the normal range.  No signs of congestive heart failure was noted.  His LV systolic function is known to be normal.        SUBJECTIVE    No Known Allergies      Past Medical History:   Diagnosis Date   • Coronary atherosclerosis of native coronary artery    • Diabetes mellitus    • Hyperlipidemia    • Hypertension    • Obesity    • Old myocardial infarction          Past Surgical History:   Procedure Laterality Date   • CARDIAC SURGERY      pt states he had one stent put in         Family History   Problem Relation Age of Onset   • Depression Mother    • Diabetes Mother    • Drug abuse Mother    • Heart disease Mother    • Hyperlipidemia Mother    • Alcohol abuse Father    • Cancer Father    • COPD Father    • Hypertension Father    • Early death Brother          Social History     Social History   • Marital status:      Spouse name: N/A   • Number of children: N/A   • Years of education: N/A     Occupational History   • Not on file.     Social History Main Topics   • Smoking status: Former Smoker     Packs/day: 2.00     Years: 45.00     Quit date: 6/9/2011   • Smokeless tobacco: Never Used   • Alcohol use Not on file   • Drug use: No   • Sexual activity: Defer     Other Topics Concern   • Not on file     Social History Narrative         Current Outpatient Prescriptions   Medication Sig Dispense Refill   •  "aspirin 325 MG tablet Take 325 mg by mouth Daily.     • carvedilol (COREG) 6.25 MG tablet Take 1 tablet by mouth Daily. 30 tablet 6   • clopidogrel (PLAVIX) 75 MG tablet Take 1 tablet by mouth Daily. 30 tablet 6   • furosemide (LASIX) 20 MG tablet Take 1 tablet by mouth Daily. 30 tablet 6   • glimepiride (AMARYL) 1 MG tablet Take 1 tablet by mouth Daily.     • isosorbide mononitrate (IMDUR) 30 MG 24 hr tablet Take 1 tablet by mouth Daily. 30 tablet 6   • losartan-hydrochlorothiazide (HYZAAR) 100-25 MG per tablet Take 1 tablet by mouth Daily. 30 tablet 6   • metFORMIN (GLUCOPHAGE) 500 MG tablet Take 1 tablet by mouth 2 (Two) Times a Day.     • rosuvastatin (CRESTOR) 10 MG tablet Take 1 tablet by mouth Daily. Use generic 30 tablet 11   • vitamin D (ERGOCALCIFEROL) 74055 UNITS capsule capsule Take 1 capsule by mouth Every 30 (Thirty) Days.     • spironolactone (ALDACTONE) 25 MG tablet Take 1 tablet by mouth Daily. 30 tablet 6     No current facility-administered medications for this visit.          OBJECTIVE    /64  Pulse 67  Ht 71.5\" (181.6 cm)  Wt (!) 342 lb (155 kg)  BMI 47.03 kg/m2        Review of Systems     Constitutional:  Denies recent weight loss, weight gain, fever or chills    HENT:  Denies any hearing loss, epistaxis, hoarseness, or difficulty speaking.     Eyes: Wears eyeglasses or contact lenses     Respiratory:  Denies dyspnea with exertion,no cough, wheezing, or hemoptysis.     Cardiovascular: Negative for palpations, chest pain, orthopnea, PND. Has  edema in both lower extremities    Gastrointestinal:  Denies change in bowel habits, dyspepsia, ulcer disease, hematochezia, or melena.     Endocrine: Negative for cold intolerance, heat intolerance, polydipsia, polyphagia and polyuria.    Genitourinary: Negative.      Musculoskeletal: Denies any history of arthritic symptoms or back problems     Skin:  Denies any change in hair or nails, rashes, or skin lesions.     Allergic/Immunologic: " Negative.  Negative for environmental allergies, food allergies and immunocompromised state.     Neurological:  Denies any history of recurrent headaches, strokes, TIA, or seizure disorder.     Physical Exam     Constitutional: Cooperative, alert and oriented, in no acute distress.  morbidly obese.  Poor hygiene    HENT:   Head: Normocephalic, normal hair patterns, no masses or tenderness.  Ears, Nose, and Throat: No gross abnormalities. No pallor or cyanosis.   Eyes: EOMS intact, PERRL, conjunctivae and lids unremarkable. Fundoscopic exam and visual fields not performed.   Neck: No palpable masses or adenopathy, no thyromegaly, no JVD, carotid pulses are full and equal bilaterally and without  Bruits.     Cardiovascular: Regular rhythm, S1 and S2 normal, no S3 or S4.  No murmurs, gallops, or rubs detected.     Pulmonary/Chest: Chest: Increased AP diameter of the chest , normal respiratory excursion, no intercostal retraction, no use of accessory muscles.            Pulmonary: Normal breath sounds. No rales or ronchi.    Abdominal: Abdomen soft, bowel sounds normoactive, no masses, no hepatosplenomegaly, non-tender, no bruits.     Musculoskeletal: No deformities, clubbing, cyanosis, 2+ pitting edema in both lower extremities     Neurological: No gross motor or sensory deficits noted, affect appropriate, oriented to time, person, place.         Procedures      Lab Results   Component Value Date    WBC 8.88 05/16/2017    HGB 12.7 (L) 05/16/2017    HCT 36.3 (L) 05/16/2017    MCV 90.1 05/16/2017     (L) 05/16/2017     Lab Results   Component Value Date    GLUCOSE 185 (H) 05/16/2017    BUN 22 (H) 05/16/2017    CREATININE 0.98 05/16/2017    EGFRIFNONA 77 05/16/2017    BCR 22.4 05/16/2017    CO2 23.0 05/16/2017    CALCIUM 9.8 05/16/2017    ALBUMIN 3.40 05/16/2017    LABIL2 1.0 (L) 05/16/2017    AST 78 (H) 05/16/2017    ALT 61 05/16/2017     No results found for: CHOL  No results found for: TRIG  No results found  for: HDL  No results found for: LDLCALC  No results found for: LDL  No results found for: HDLLDLRATIO  No components found for: CHOLHDL  No results found for: HGBA1C  No results found for: TSH, Y0PXSFW, T0KEQXB, THYROIDAB        ASSESSMENT AND PLAN    Mr. Alvarez has no evidence of progression of coronary artery disease or congestive heart failure.  I suspect that his leg edema is multifactorial and I have advised him to continue losartan HCTZ, Coreg for management of blood pressure and antiplatelet therapy with aspirin and Plavix has been continued.  Antianginal therapy with isosorbide mononitrate and statin therapy with Crestor has been continued.  I have started him on Aldactone 25 mg daily with hold that his edema will decrease.  I have advised him to use compression stockings.  Diagnoses and all orders for this visit:    Atherosclerosis of native coronary artery of native heart without angina pectoris    Old myocardial infarction    Essential hypertension    Mixed hyperlipidemia    Other orders  -     spironolactone (ALDACTONE) 25 MG tablet; Take 1 tablet by mouth Daily.        Robinson Fierro MD  8/9/2017  3:05 PM

## 2017-10-30 RX ORDER — ATORVASTATIN CALCIUM 20 MG/1
20 TABLET, FILM COATED ORAL DAILY
Qty: 30 TABLET | Refills: 11 | Status: SHIPPED | OUTPATIENT
Start: 2017-10-30 | End: 2018-03-08

## 2017-10-30 RX ORDER — CLOPIDOGREL BISULFATE 75 MG/1
75 TABLET ORAL DAILY
Qty: 90 TABLET | Refills: 3 | Status: SHIPPED | OUTPATIENT
Start: 2017-10-30 | End: 2018-06-25 | Stop reason: SDUPTHER

## 2018-02-26 RX ORDER — CARVEDILOL 6.25 MG/1
TABLET ORAL
Qty: 30 TABLET | Refills: 6 | Status: SHIPPED | OUTPATIENT
Start: 2018-02-26 | End: 2018-06-25 | Stop reason: SDUPTHER

## 2018-03-08 ENCOUNTER — OFFICE VISIT (OUTPATIENT)
Dept: CARDIOLOGY | Facility: CLINIC | Age: 63
End: 2018-03-08

## 2018-03-08 VITALS
HEART RATE: 68 BPM | HEIGHT: 72 IN | WEIGHT: 315 LBS | SYSTOLIC BLOOD PRESSURE: 146 MMHG | DIASTOLIC BLOOD PRESSURE: 72 MMHG | BODY MASS INDEX: 42.66 KG/M2

## 2018-03-08 DIAGNOSIS — I25.10 ATHEROSCLEROSIS OF NATIVE CORONARY ARTERY OF NATIVE HEART WITHOUT ANGINA PECTORIS: Primary | ICD-10-CM

## 2018-03-08 DIAGNOSIS — E78.2 MIXED HYPERLIPIDEMIA: ICD-10-CM

## 2018-03-08 DIAGNOSIS — I25.2 OLD MYOCARDIAL INFARCTION: ICD-10-CM

## 2018-03-08 DIAGNOSIS — I10 ESSENTIAL HYPERTENSION: ICD-10-CM

## 2018-03-08 PROCEDURE — 99214 OFFICE O/P EST MOD 30 MIN: CPT | Performed by: INTERNAL MEDICINE

## 2018-03-08 RX ORDER — DIPHENHYDRAMINE HCL 25 MG
25 CAPSULE ORAL NIGHTLY PRN
COMMUNITY
End: 2021-12-07

## 2018-03-08 RX ORDER — SIMVASTATIN 20 MG
20 TABLET ORAL NIGHTLY
Qty: 30 TABLET | Refills: 6 | Status: SHIPPED | OUTPATIENT
Start: 2018-03-08 | End: 2018-03-08 | Stop reason: SDUPTHER

## 2018-03-08 RX ORDER — SIMVASTATIN 20 MG
20 TABLET ORAL NIGHTLY
Qty: 30 TABLET | Refills: 6 | Status: SHIPPED | OUTPATIENT
Start: 2018-03-08 | End: 2018-06-25 | Stop reason: SDUPTHER

## 2018-03-08 NOTE — PROGRESS NOTES
Aramis Alvarez  62 y.o. male    03/08/2018  1. Atherosclerosis of native coronary artery of native heart without angina pectoris    2. Essential hypertension    3. Mixed hyperlipidemia    4. Old myocardial infarction        History of Present Illness    Mr. Alvarez is here for follow-up of his above stated problems.  He denied any chest pain, shortness of breath, palpitation, dizziness or syncope.  He has been compliant with his medications.  He had a lipid profile checked recently and his triglycerides were more than 400.      SUBJECTIVE    Allergies   Allergen Reactions   • Atorvastatin Other (See Comments)     Leg cramps/muscle cramps         Past Medical History:   Diagnosis Date   • Coronary atherosclerosis of native coronary artery    • Diabetes mellitus    • Hyperlipidemia    • Hypertension    • Obesity    • Old myocardial infarction          Past Surgical History:   Procedure Laterality Date   • CARDIAC SURGERY      pt states he had one stent put in         Family History   Problem Relation Age of Onset   • Depression Mother    • Diabetes Mother    • Drug abuse Mother    • Heart disease Mother    • Hyperlipidemia Mother    • Alcohol abuse Father    • Cancer Father    • COPD Father    • Hypertension Father    • Early death Brother          Social History     Social History   • Marital status:      Spouse name: N/A   • Number of children: N/A   • Years of education: N/A     Occupational History   • Not on file.     Social History Main Topics   • Smoking status: Former Smoker     Packs/day: 2.00     Years: 45.00     Quit date: 6/9/2011   • Smokeless tobacco: Never Used   • Alcohol use Not on file   • Drug use: No   • Sexual activity: Defer     Other Topics Concern   • Not on file     Social History Narrative         Current Outpatient Prescriptions   Medication Sig Dispense Refill   • aspirin 325 MG tablet Take 325 mg by mouth Daily.     • carvedilol (COREG) 6.25 MG tablet TAKE ONE TABLET BY MOUTH ONCE  "DAILY 30 tablet 6   • clopidogrel (PLAVIX) 75 MG tablet Take 1 tablet by mouth Daily. 90 tablet 3   • diphenhydrAMINE (BENADRYL) 25 mg capsule Take 25 mg by mouth At Night As Needed for Itching.     • furosemide (LASIX) 20 MG tablet Take 1 tablet by mouth Daily. 30 tablet 6   • glimepiride (AMARYL) 1 MG tablet Take 1 tablet by mouth Daily.     • isosorbide mononitrate (IMDUR) 30 MG 24 hr tablet Take 1 tablet by mouth Daily. 30 tablet 6   • losartan-hydrochlorothiazide (HYZAAR) 100-25 MG per tablet Take 1 tablet by mouth Daily. 30 tablet 6   • metFORMIN (GLUCOPHAGE) 500 MG tablet Take 1 tablet by mouth 2 (Two) Times a Day.     • spironolactone (ALDACTONE) 25 MG tablet Take 1 tablet by mouth Daily. 30 tablet 6   • simvastatin (ZOCOR) 20 MG tablet Take 1 tablet by mouth Every Night. 30 tablet 6   • simvastatin (ZOCOR) 20 MG tablet Take 1 tablet by mouth Every Night. 30 tablet 6     No current facility-administered medications for this visit.          OBJECTIVE    /72  Pulse 68  Ht 181.6 cm (71.5\")  Wt (!) 158 kg (348 lb)  BMI 47.86 kg/m2        Review of Systems     Constitutional:  Denies recent weight loss, weight gain, fever or chills, no change in exercise tolerance     HENT:  Denies any hearing loss, epistaxis, hoarseness, or difficulty speaking.     Eyes: Wears eyeglasses or contact lenses     Respiratory:  Denies dyspnea with exertion,no cough, wheezing, or hemoptysis.     Cardiovascular: Negative for palpations, chest pain, orthopnea, PND, peripheral edema, syncope, or claudication.     Gastrointestinal:  Denies change in bowel habits, dyspepsia, ulcer disease, hematochezia, or melena.     Endocrine: Negative for cold intolerance, heat intolerance, polydipsia, polyphagia and polyuria. Denies any history of weight change, heat/cold intolerance, polydipsia, polyuria     Genitourinary: Negative.      Musculoskeletal: Denies any history of arthritic symptoms or back problems     Skin:  Denies any change " in hair or nails, rashes, or skin lesions.     Allergic/Immunologic: Negative.  Negative for environmental allergies, food allergies and immunocompromised state.     Neurological:  Denies any history of recurrent headaches, strokes, TIA, or seizure disorder.     Hematological: Denies any food allergies, seasonal allergies, bleeding disorders, or lymphadenopathy.     Psychiatric/Behavioral: Denies any history of depression, substance abuse, or change in cognitive function.         Physical Exam     Constitutional: Cooperative, alert and oriented, well-developed, well-nourished, in no acute distress.  obese    HENT:   Head: Normocephalic, normal hair patterns, no masses or tenderness.  Ears, Nose, and Throat: No gross abnormalities. No pallor or cyanosis. Dentition good.   Eyes: EOMS intact, PERRL, conjunctivae and lids unremarkable. Fundoscopic exam and visual fields not performed.   Neck: No palpable masses or adenopathy, no thyromegaly, no JVD, carotid pulses are full and equal bilaterally and without  Bruits.     Cardiovascular: Regular rhythm, S1 and S2 normal, no S3 or S4.  No murmurs, gallops, or rubs detected.     Pulmonary/Chest: Chest: normal symmetry, no tenderness to palpation, normal respiratory excursion, no intercostal retraction, no use of accessory muscles.            Pulmonary: Normal breath sounds. No rales or ronchi.    Abdominal: Abdomen soft, bowel sounds normoactive, no masses, no hepatosplenomegaly, non-tender, no bruits.     Musculoskeletal: No deformities, clubbing, cyanosis, erythema, or edema observed. There are no spinal abnormalities noted.     Neurological: No gross motor or sensory deficits noted, affect appropriate, oriented to time, person, place.     Skin: Warm and dry to the touch, no apparent skin lesions or masses noted.     Psychiatric: He has a normal mood and affect. His behavior is normal. Judgment and thought content normal.         Procedures      Lab Results   Component  Value Date    WBC 8.88 05/16/2017    HGB 12.7 (L) 05/16/2017    HCT 36.3 (L) 05/16/2017    MCV 90.1 05/16/2017     (L) 05/16/2017     Lab Results   Component Value Date    GLUCOSE 185 (H) 05/16/2017    BUN 22 (H) 05/16/2017    CREATININE 0.98 05/16/2017    EGFRIFNONA 77 05/16/2017    BCR 22.4 05/16/2017    CO2 23.0 05/16/2017    CALCIUM 9.8 05/16/2017    ALBUMIN 3.40 05/16/2017    LABIL2 1.0 (L) 05/16/2017    AST 78 (H) 05/16/2017    ALT 61 05/16/2017     No results found for: CHOL  No results found for: TRIG  No results found for: HDL  No components found for: LDLCALC  No results found for: LDL  No results found for: HDLLDLRATIO  No components found for: CHOLHDL  No results found for: HGBA1C  No results found for: TSH, D4GOCHH, U3WNYGA, THYROIDAB        ASSESSMENT AND PLAN  Mr. Alvarez is stable with no evidence of angina, arrhythmia or congestive heart failure.  I believe that he would benefit from statins and the patient has had intolerance to atorvastatin in the past.  Simvastatin 20 mg daily has been started since he has tolerated this medication in the past.  Antiplatelet therapy with aspirin and Plavix, antihypertensive therapy with Coreg, losartan HCTZ, diuretic therapy with Lasix and Aldactone have been continued.    Aramis was seen today for follow-up.    Diagnoses and all orders for this visit:    Atherosclerosis of native coronary artery of native heart without angina pectoris    Essential hypertension    Mixed hyperlipidemia    Old myocardial infarction    Other orders  -     Discontinue: simvastatin (ZOCOR) 20 MG tablet; Take 1 tablet by mouth Every Night.        Robinson Fierro MD  3/8/2018  2:06 PM

## 2018-03-26 RX ORDER — SPIRONOLACTONE 25 MG/1
TABLET ORAL
Qty: 30 TABLET | Refills: 6 | Status: SHIPPED | OUTPATIENT
Start: 2018-03-26 | End: 2018-06-25 | Stop reason: SDUPTHER

## 2018-06-25 RX ORDER — SPIRONOLACTONE 25 MG/1
25 TABLET ORAL DAILY
Qty: 30 TABLET | Refills: 6 | Status: SHIPPED | OUTPATIENT
Start: 2018-06-25 | End: 2018-09-13 | Stop reason: SDUPTHER

## 2018-06-25 RX ORDER — CARVEDILOL 6.25 MG/1
6.25 TABLET ORAL DAILY
Qty: 30 TABLET | Refills: 6 | Status: SHIPPED | OUTPATIENT
Start: 2018-06-25 | End: 2018-09-13 | Stop reason: SDUPTHER

## 2018-06-25 RX ORDER — LOSARTAN POTASSIUM AND HYDROCHLOROTHIAZIDE 25; 100 MG/1; MG/1
1 TABLET ORAL DAILY
Qty: 30 TABLET | Refills: 6 | Status: SHIPPED | OUTPATIENT
Start: 2018-06-25 | End: 2018-11-26

## 2018-06-25 RX ORDER — FUROSEMIDE 20 MG/1
TABLET ORAL
Qty: 30 TABLET | Refills: 6 | Status: SHIPPED | OUTPATIENT
Start: 2018-06-25 | End: 2019-01-22 | Stop reason: SDUPTHER

## 2018-06-25 RX ORDER — SIMVASTATIN 20 MG
20 TABLET ORAL NIGHTLY
Qty: 30 TABLET | Refills: 6 | Status: SHIPPED | OUTPATIENT
Start: 2018-06-25 | End: 2018-11-26

## 2018-06-25 RX ORDER — CLOPIDOGREL BISULFATE 75 MG/1
75 TABLET ORAL DAILY
Qty: 90 TABLET | Refills: 2 | Status: SHIPPED | OUTPATIENT
Start: 2018-06-25 | End: 2018-11-26

## 2018-06-25 RX ORDER — ISOSORBIDE MONONITRATE 30 MG/1
30 TABLET, EXTENDED RELEASE ORAL DAILY
Qty: 30 TABLET | Refills: 6 | Status: SHIPPED | OUTPATIENT
Start: 2018-06-25 | End: 2019-01-22 | Stop reason: SDUPTHER

## 2018-07-17 ENCOUNTER — HOSPITAL ENCOUNTER (OUTPATIENT)
Dept: ULTRASOUND IMAGING | Facility: HOSPITAL | Age: 63
Discharge: HOME | End: 2018-07-17
Admitting: NURSE PRACTITIONER

## 2018-07-17 DIAGNOSIS — M54.5 LOW BACK PAIN, UNSPECIFIED BACK PAIN LATERALITY, UNSPECIFIED CHRONICITY, WITH SCIATICA PRESENCE UNSPECIFIED: ICD-10-CM

## 2018-07-17 PROCEDURE — 76705 ECHO EXAM OF ABDOMEN: CPT

## 2018-08-06 RX ORDER — CLOPIDOGREL BISULFATE 75 MG/1
75 TABLET ORAL DAILY
Qty: 90 TABLET | Refills: 3 | OUTPATIENT
Start: 2018-08-06

## 2018-09-06 RX ORDER — SIMVASTATIN 20 MG
TABLET ORAL
Qty: 30 TABLET | Refills: 6 | Status: SHIPPED | OUTPATIENT
Start: 2018-09-06 | End: 2019-01-22 | Stop reason: SDUPTHER

## 2018-09-13 ENCOUNTER — OFFICE VISIT (OUTPATIENT)
Dept: CARDIOLOGY | Facility: CLINIC | Age: 63
End: 2018-09-13

## 2018-09-13 VITALS
BODY MASS INDEX: 44.1 KG/M2 | HEIGHT: 71 IN | DIASTOLIC BLOOD PRESSURE: 50 MMHG | HEART RATE: 74 BPM | SYSTOLIC BLOOD PRESSURE: 118 MMHG | OXYGEN SATURATION: 98 % | WEIGHT: 315 LBS

## 2018-09-13 DIAGNOSIS — E78.2 MIXED HYPERLIPIDEMIA: ICD-10-CM

## 2018-09-13 DIAGNOSIS — I10 ESSENTIAL HYPERTENSION: ICD-10-CM

## 2018-09-13 DIAGNOSIS — I25.10 ATHEROSCLEROSIS OF NATIVE CORONARY ARTERY OF NATIVE HEART WITHOUT ANGINA PECTORIS: Primary | ICD-10-CM

## 2018-09-13 PROCEDURE — 99214 OFFICE O/P EST MOD 30 MIN: CPT | Performed by: INTERNAL MEDICINE

## 2018-09-13 RX ORDER — SPIRONOLACTONE 25 MG/1
25 TABLET ORAL DAILY
Qty: 90 TABLET | Refills: 3 | Status: SHIPPED | OUTPATIENT
Start: 2018-09-13 | End: 2019-01-22 | Stop reason: SDUPTHER

## 2018-09-13 RX ORDER — CARVEDILOL 6.25 MG/1
6.25 TABLET ORAL DAILY
Qty: 90 TABLET | Refills: 3 | Status: SHIPPED | OUTPATIENT
Start: 2018-09-13 | End: 2019-01-22 | Stop reason: SDUPTHER

## 2018-09-13 NOTE — PROGRESS NOTES
Aramis Alvarez  62 y.o. male    09/13/2018  1. Atherosclerosis of native coronary artery of native heart without angina pectoris    2. Essential hypertension    3. Mixed hyperlipidemia        History of Present Illness    Mr. Alvarez is here for follow-up of his above stated problems.  He denied any chest pain or shortness of breath and indicates that he had an ultrasound of the liver recently and this showed a fatty liver for which he will be evaluated by GI.  He has been compliant with his medications and his blood pressures in the normal range.  He had lab work done in July this year that [facility] and electrolytes, it profiles were in the normal range.  Clinical exam today did not reveal any signs of congestive heart failure.    His last cardiac catheterization was in June 2011.  He had PCI to left anterior deciding coronary artery and his right coronary artery has diffuse disease with chronic total occlusion with left-to-right collaterals        SUBJECTIVE    Allergies   Allergen Reactions   • Atorvastatin Other (See Comments)     Leg cramps/muscle cramps         Past Medical History:   Diagnosis Date   • Coronary atherosclerosis of native coronary artery    • Diabetes mellitus (CMS/HCC)    • Hyperlipidemia    • Hypertension    • Obesity    • Old myocardial infarction          Past Surgical History:   Procedure Laterality Date   • CARDIAC SURGERY      pt states he had one stent put in         Family History   Problem Relation Age of Onset   • Depression Mother    • Diabetes Mother    • Drug abuse Mother    • Heart disease Mother    • Hyperlipidemia Mother    • Alcohol abuse Father    • Cancer Father    • COPD Father    • Hypertension Father    • Early death Brother          Social History     Social History   • Marital status:      Spouse name: N/A   • Number of children: N/A   • Years of education: N/A     Occupational History   • Not on file.     Social History Main Topics   • Smoking status:  "Former Smoker     Packs/day: 2.00     Years: 45.00     Quit date: 6/9/2011   • Smokeless tobacco: Never Used   • Alcohol use Not on file   • Drug use: No   • Sexual activity: Defer     Other Topics Concern   • Not on file     Social History Narrative   • No narrative on file         Current Outpatient Prescriptions   Medication Sig Dispense Refill   • aspirin 325 MG tablet Take 325 mg by mouth Daily.     • carvedilol (COREG) 6.25 MG tablet Take 1 tablet by mouth Daily. 90 tablet 3   • clopidogrel (PLAVIX) 75 MG tablet Take 1 tablet by mouth Daily. 90 tablet 2   • diphenhydrAMINE (BENADRYL) 25 mg capsule Take 25 mg by mouth At Night As Needed for Itching.     • furosemide (LASIX) 20 MG tablet TAKE ONE TABLET BY MOUTH ONCE DAILY 30 tablet 6   • glimepiride (AMARYL) 1 MG tablet Take 1 tablet by mouth Daily.     • isosorbide mononitrate (IMDUR) 30 MG 24 hr tablet Take 1 tablet by mouth Daily. 30 tablet 6   • losartan-hydrochlorothiazide (HYZAAR) 100-25 MG per tablet Take 1 tablet by mouth Daily. 30 tablet 6   • metFORMIN (GLUCOPHAGE) 500 MG tablet Take 1 tablet by mouth 2 (Two) Times a Day.     • simvastatin (ZOCOR) 20 MG tablet Take 1 tablet by mouth Every Night. 30 tablet 6   • simvastatin (ZOCOR) 20 MG tablet TAKE ONE TABLET BY MOUTH EVERY NIGHT 30 tablet 6   • spironolactone (ALDACTONE) 25 MG tablet Take 1 tablet by mouth Daily. 90 tablet 3     No current facility-administered medications for this visit.          OBJECTIVE    /50   Pulse 74   Ht 181.6 cm (71.5\")   Wt (!) 168 kg (371 lb)   SpO2 98%   BMI 51.03 kg/m²         Review of Systems     Constitutional: weight gain, No fever or chills     HENT:  Denies any hearing loss, epistaxis, hoarseness, or difficulty speaking.     Eyes: Wears eyeglasses or contact lenses     Respiratory:  Denies dyspnea with exertion,no cough, wheezing, or hemoptysis.     Cardiovascular: Negative for palpations, chest pain    Gastrointestinal:  Denies change in bowel habits, " dyspepsia, ulcer disease, hematochezia, or melena.  fatty liver    Endocrine: Negative for cold intolerance, heat intolerance, polydipsia, polyphagia and polyuria.  Diabetes mellitus, hyperlipidemia    Genitourinary: Negative.      Musculoskeletal: DJD    Skin:  Denies any change in hair or nails, rashes, or skin lesions.     Allergic/Immunologic: Negative.  Negative for environmental allergies, food allergies and immunocompromised state.     Neurological:  Denies any history of recurrent headaches, strokes, TIA, or seizure disorder.     Hematological: Denies any food allergies, seasonal allergies, bleeding disorders, or lymphadenopathy.     Psychiatric/Behavioral: Denies any history of depression, substance abuse, or change in cognitive function.         Physical Exam     Constitutional: Cooperative, alert and oriented, obese, in no acute distress    HENT:   Head: Normocephalic, normal hair patterns, no masses or tenderness.  Ears, Nose, and Throat: No gross abnormalities. No pallor or cyanosis.   Eyes: EOMS intact, PERRL, conjunctivae and lids unremarkable. Fundoscopic exam and visual fields not performed.   Neck: No palpable masses or adenopathy, no thyromegaly, no JVD, carotid pulses are full and equal bilaterally and without  Bruits.     Cardiovascular: Regular rhythm, S1 and S2 normal, no S3 or S4.No murmurs, gallops, or rubs detected.     Pulmonary/Chest: Chest: Increased AP diameter of the chest normal respiratory excursion, no intercostal retraction, no use of accessory muscles.            Pulmonary: Normal breath sounds. No rales or ronchi.    Abdominal: Abdomen soft, bowel sounds normoactive, no masses, obese    Musculoskeletal: No deformities, clubbing, cyanosis, erythema, or edema observed.    Neurological: No gross motor or sensory deficits noted, affect appropriate, oriented to time, person, place.     Skin: Warm and dry to the touch, no apparent skin lesions or masses noted.     Psychiatric: He has a  normal mood and affect. His behavior is normal. Judgment and thought content normal.         Procedures      Lab Results   Component Value Date    WBC 8.88 05/16/2017    HGB 12.7 (L) 05/16/2017    HCT 36.3 (L) 05/16/2017    MCV 90.1 05/16/2017     (L) 05/16/2017     Lab Results   Component Value Date    GLUCOSE 185 (H) 05/16/2017    BUN 22 (H) 05/16/2017    CREATININE 0.98 05/16/2017    EGFRIFNONA 77 05/16/2017    BCR 22.4 05/16/2017    CO2 23.0 05/16/2017    CALCIUM 9.8 05/16/2017    ALBUMIN 3.40 05/16/2017    AST 78 (H) 05/16/2017    ALT 61 05/16/2017     No results found for: CHOL  No results found for: TRIG  No results found for: HDL  No components found for: LDLCALC  No results found for: LDL  No results found for: HDLLDLRATIO  No components found for: CHOLHDL  No results found for: HGBA1C  No results found for: TSH, Q2YYDTO, P1DQLVX, THYROIDAB        ASSESSMENT AND PLAN  Mr. Alvarez is stable with no clinical evidence of angina, arrhythmia or congestive heart failure.  Antiplatelet therapy with aspirin and Plavix, antihypertensive therapy with Coreg, losartan HCTZ, diuretic therapy with Lasix and Aldactone and antianginal therapy with isosorbide mononitrate have been continued.  Lowering therapy with simvastatin has been continued.  Prescriptions were refilled.    Aramis was seen today for follow-up.    Diagnoses and all orders for this visit:    Atherosclerosis of native coronary artery of native heart without angina pectoris    Essential hypertension    Mixed hyperlipidemia    Other orders  -     carvedilol (COREG) 6.25 MG tablet; Take 1 tablet by mouth Daily.  -     spironolactone (ALDACTONE) 25 MG tablet; Take 1 tablet by mouth Daily.        Robinson Fierro MD  9/13/2018  1:17 PM

## 2018-11-05 RX ORDER — CLOPIDOGREL BISULFATE 75 MG/1
75 TABLET ORAL DAILY
Qty: 90 TABLET | Refills: 3 | Status: SHIPPED | OUTPATIENT
Start: 2018-11-05 | End: 2019-01-22 | Stop reason: SDUPTHER

## 2018-11-26 ENCOUNTER — TELEPHONE (OUTPATIENT)
Dept: ENDOCRINOLOGY | Facility: CLINIC | Age: 63
End: 2018-11-26

## 2018-11-26 ENCOUNTER — OFFICE VISIT (OUTPATIENT)
Dept: ENDOCRINOLOGY | Facility: CLINIC | Age: 63
End: 2018-11-26

## 2018-11-26 VITALS
WEIGHT: 315 LBS | OXYGEN SATURATION: 98 % | BODY MASS INDEX: 42.66 KG/M2 | HEART RATE: 79 BPM | DIASTOLIC BLOOD PRESSURE: 80 MMHG | HEIGHT: 72 IN | SYSTOLIC BLOOD PRESSURE: 172 MMHG

## 2018-11-26 DIAGNOSIS — E11.59 HYPERTENSION ASSOCIATED WITH DIABETES (HCC): ICD-10-CM

## 2018-11-26 DIAGNOSIS — E78.2 MIXED DIABETIC HYPERLIPIDEMIA ASSOCIATED WITH TYPE 2 DIABETES MELLITUS (HCC): ICD-10-CM

## 2018-11-26 DIAGNOSIS — E11.65 TYPE 2 DIABETES MELLITUS WITH HYPERGLYCEMIA, WITHOUT LONG-TERM CURRENT USE OF INSULIN (HCC): Primary | ICD-10-CM

## 2018-11-26 DIAGNOSIS — I25.10 ATHEROSCLEROSIS OF NATIVE CORONARY ARTERY OF NATIVE HEART WITHOUT ANGINA PECTORIS: ICD-10-CM

## 2018-11-26 DIAGNOSIS — E11.69 MIXED DIABETIC HYPERLIPIDEMIA ASSOCIATED WITH TYPE 2 DIABETES MELLITUS (HCC): ICD-10-CM

## 2018-11-26 DIAGNOSIS — I15.2 HYPERTENSION ASSOCIATED WITH DIABETES (HCC): ICD-10-CM

## 2018-11-26 PROCEDURE — 99204 OFFICE O/P NEW MOD 45 MIN: CPT | Performed by: INTERNAL MEDICINE

## 2018-11-26 RX ORDER — LOSARTAN POTASSIUM 100 MG/1
100 TABLET ORAL DAILY
Qty: 30 TABLET | Refills: 11 | Status: SHIPPED | OUTPATIENT
Start: 2018-11-26 | End: 2019-01-22 | Stop reason: SDUPTHER

## 2018-11-26 NOTE — PROGRESS NOTES
" Aramis Alvarez is a 63 y.o. male who presents for  evaluation of   Chief Complaint   Patient presents with   • thyroid issues       Referring provider    Mónica Seay, GÉNESIS  [address] [place] [address]    Primary Care Provider    Mónica Seay, GÉNESIS    Duration 10 years    Timing - Diabetes is Constant    Quality -  needs improvement    Severity -  severe    Complications - cardiovascular disease  And proteinuria    Current symptoms/problems  increase appetite     Alleviating Factors: Compliance      Side Effects  none    Current diet  in general, an \"unhealthy\" diet    Current exercise none    Current monitoring regimen: home blood tests - checking 1 x weekly    Home blood sugar records: 100s when he checks    Hypoglycemia nocturnal and if skipped meals      Past Medical History:   Diagnosis Date   • Coronary atherosclerosis of native coronary artery    • Diabetes mellitus (CMS/HCC)    • Hyperlipidemia    • Hypertension    • Obesity    • Old myocardial infarction      Family History   Problem Relation Age of Onset   • Depression Mother    • Diabetes Mother    • Drug abuse Mother    • Heart disease Mother    • Hyperlipidemia Mother    • Alcohol abuse Father    • Cancer Father    • COPD Father    • Hypertension Father    • Early death Brother      Social History     Tobacco Use   • Smoking status: Former Smoker     Packs/day: 2.00     Years: 45.00     Pack years: 90.00     Last attempt to quit: 2011     Years since quittin.4   • Smokeless tobacco: Never Used   Substance Use Topics   • Alcohol use: Not on file   • Drug use: No         Current Outpatient Medications:   •  aspirin 325 MG tablet, Take 325 mg by mouth Daily., Disp: , Rfl:   •  carvedilol (COREG) 6.25 MG tablet, Take 1 tablet by mouth Daily., Disp: 90 tablet, Rfl: 3  •  clopidogrel (PLAVIX) 75 MG tablet, TAKE 1 TABLET BY MOUTH DAILY., Disp: 90 tablet, Rfl: 3  •  diphenhydrAMINE (BENADRYL) 25 mg capsule, Take 25 mg by " mouth At Night As Needed for Itching., Disp: , Rfl:   •  furosemide (LASIX) 20 MG tablet, TAKE ONE TABLET BY MOUTH ONCE DAILY, Disp: 30 tablet, Rfl: 6  •  glimepiride (AMARYL) 1 MG tablet, Take 1 tablet by mouth Daily., Disp: , Rfl:   •  isosorbide mononitrate (IMDUR) 30 MG 24 hr tablet, Take 1 tablet by mouth Daily., Disp: 30 tablet, Rfl: 6  •  losartan-hydrochlorothiazide (HYZAAR) 100-25 MG per tablet, Take 1 tablet by mouth Daily., Disp: 30 tablet, Rfl: 6  •  metFORMIN (GLUCOPHAGE) 500 MG tablet, Take 1 tablet by mouth 2 (Two) Times a Day., Disp: , Rfl:   •  simvastatin (ZOCOR) 20 MG tablet, TAKE ONE TABLET BY MOUTH EVERY NIGHT, Disp: 30 tablet, Rfl: 6  •  spironolactone (ALDACTONE) 25 MG tablet, Take 1 tablet by mouth Daily., Disp: 90 tablet, Rfl: 3    Review of Systems    Review of Systems   Constitutional: Negative for activity change, appetite change, chills, diaphoresis, fatigue, fever and unexpected weight change.   HENT: Negative for congestion, dental problem, drooling, ear discharge, ear pain, facial swelling, mouth sores, postnasal drip, rhinorrhea, sinus pressure, sore throat, tinnitus, trouble swallowing and voice change.    Eyes: Negative for photophobia, pain, discharge, redness, itching and visual disturbance.   Respiratory: Negative for apnea, cough, choking, chest tightness, shortness of breath, wheezing and stridor.    Cardiovascular: Negative for chest pain, palpitations and leg swelling.   Gastrointestinal: Negative for abdominal distention, abdominal pain, constipation, diarrhea, nausea and vomiting.   Endocrine: Negative for cold intolerance, heat intolerance, polydipsia, polyphagia and polyuria.   Genitourinary: Negative for decreased urine volume, difficulty urinating, dysuria, flank pain, frequency, hematuria and urgency.   Musculoskeletal: Negative for arthralgias, back pain, gait problem, joint swelling, myalgias, neck pain and neck stiffness.   Skin: Negative for color change, pallor,  "rash and wound.   Allergic/Immunologic: Negative for immunocompromised state.   Neurological: Negative for dizziness, tremors, seizures, syncope, facial asymmetry, speech difficulty, weakness, light-headedness, numbness and headaches.   Hematological: Negative for adenopathy.   Psychiatric/Behavioral: Negative for agitation, behavioral problems, confusion, decreased concentration, dysphoric mood, hallucinations, self-injury, sleep disturbance and suicidal ideas. The patient is not nervous/anxious and is not hyperactive.         Objective:   /80   Pulse 79   Ht 181.6 cm (71.5\")   Wt (!) 165 kg (363 lb 8 oz)   SpO2 98%   BMI 49.99 kg/m²     Physical Exam   Constitutional: He is oriented to person, place, and time. He appears well-developed and well-nourished. He is cooperative.   HENT:   Head: Normocephalic and atraumatic.   Right Ear: External ear normal.   Left Ear: External ear normal.   Nose: Nose normal.   Mouth/Throat: Oropharynx is clear and moist. No oropharyngeal exudate.   Eyes: Conjunctivae and EOM are normal. Pupils are equal, round, and reactive to light. No scleral icterus. Right eye exhibits normal extraocular motion. Left eye exhibits normal extraocular motion.   Neck: Neck supple. No JVD present. No muscular tenderness present. No tracheal deviation, no edema and no erythema present. No thyromegaly present.   Cardiovascular: Normal rate, regular rhythm, normal heart sounds and intact distal pulses. Exam reveals no gallop and no friction rub.   No murmur heard.  Pulmonary/Chest: Effort normal and breath sounds normal. No stridor. No respiratory distress. He has no decreased breath sounds. He has no wheezes. He has no rhonchi. He has no rales. He exhibits no tenderness.   Abdominal: Soft. Bowel sounds are normal. He exhibits no distension and no mass. There is no hepatomegaly. There is no tenderness. There is no rebound and no guarding. No hernia.   Musculoskeletal: Normal range of motion. He " exhibits no edema, tenderness or deformity.   Lymphadenopathy:     He has no cervical adenopathy.   Neurological: He is alert and oriented to person, place, and time. He has normal reflexes. No cranial nerve deficit. He exhibits normal muscle tone. Coordination normal.   Skin: Skin is warm. No rash noted. No erythema. No pallor.   Psychiatric: He has a normal mood and affect. His behavior is normal. Judgment and thought content normal.   Nursing note and vitals reviewed.      Lab Review          Assessment/Plan       ICD-10-CM ICD-9-CM   1. Type 2 diabetes mellitus with hyperglycemia, without long-term current use of insulin (CMS/[facility]) E11.65 250.00     790.29   2. Hypertension associated with diabetes (CMS/HCC) E11.59 250.80    I10 401.9   3. Mixed diabetic hyperlipidemia associated with type 2 diabetes mellitus (CMS/HCC) E11.69 250.80    E78.2 272.2   4. Atherosclerosis of native coronary artery of native heart without angina pectoris I25.10 414.01         I reviewed and summarized records from Mónica Seay APRN from 2018 and I reviewed / ordered labs.   From review of records :    Pt has type 2 DM with hypoglycemia and last Aic of 8%    Glycemic Management:   No results found for: HGBA1C  Lab Results   Component Value Date    GLUCOSE 185 (H) 05/16/2017    BUN 22 (H) 05/16/2017    CREATININE 0.98 05/16/2017    EGFRIFNONA 77 05/16/2017    BCR 22.4 05/16/2017    K 3.9 05/16/2017    CO2 23.0 05/16/2017    CALCIUM 9.8 05/16/2017    ALBUMIN 3.40 05/16/2017    AST 78 (H) 05/16/2017    ALT 61 05/16/2017    ANIONGAP 13.0 05/16/2017     Lab Results   Component Value Date    WBC 8.88 05/16/2017    HGB 12.7 (L) 05/16/2017    HCT 36.3 (L) 05/16/2017    MCV 90.1 05/16/2017     (L) 05/16/2017     Metformin 1 g bid and glimepiride    Change to synjrady xr 5/1000 , 2 tabs w bkfast    Rationale, he has CAD       Lipid Management  No results found for: CHOL  No results found for: TRIG  No results found for:  HDL  No components found for: LDLCALC  No results found for: LDL  No results found for: LDLDIRECT    zocor 20 mg qhs     He has CAD , consider lipitor or crestor      Blood Pressure Management:    Vitals:    11/26/18 0811   BP: 172/80   Pulse: 79   SpO2: 98%     Lab Results   Component Value Date    GLUCOSE 185 (H) 05/16/2017    CALCIUM 9.8 05/16/2017     (L) 05/16/2017    K 3.9 05/16/2017    CO2 23.0 05/16/2017     05/16/2017    BUN 22 (H) 05/16/2017    CREATININE 0.98 05/16/2017    EGFRIFNONA 77 05/16/2017    BCR 22.4 05/16/2017    ANIONGAP 13.0 05/16/2017       Elevated today     Coreg 6.25 mg po bid    Lasix 20 mg daily -- try to use only as needed     imdur 30 mg daily     hyzaar 100-25 mg daily -- stop and change to losartan 100 mg daily alone since hctz can raise calcium     Aldactone 25 mg daily     Microvascular Complication Monitoring:      Eye Exam Evaluation, prefers not ot     -----------    Last Microalbumin-Proteinuria Assessment    No results found for: MALBCRERATIO    Lab Results   Component Value Date    UTPCR 380 (H) 11/22/2016    UTPCR 2,160 (H) 02/22/2016       -----------      Neuropathy  None          Preventive Care:        Weight Related:   Wt Readings from Last 3 Encounters:   11/26/18 (!) 165 kg (363 lb 8 oz)   09/13/18 (!) 168 kg (371 lb)   03/08/18 (!) 158 kg (348 lb)     Body mass index is 49.99 kg/m².        Diet interventions: moderate (500 kCal/d) deficit diet.      Bone Health    Lab Results   Component Value Date    CALCIUM 9.8 05/16/2017    PHOS 2.9 02/22/2016    JXIH80IN 24.1 (L) 11/22/2016       PTH 80  Calcium 10.5  Creat 1.25    Vit D 18     PTH mediated hypercalcemia  h o kidney stone at age 13   No fractures  gfr above 60  Calcium less than 11.2    Taking away hctz may be enough    As long as he doesn't develop any of above complications , we can only observe     He should take one tab of calcium plus d daily     Thyroid Health    No results found for: TSH          Other Diabetes Related Aspects       No results found for: [id number]          No orders of the defined types were placed in this encounter.        A copy of my note was sent to Mónica Seay APRN    Please see my above opinion and suggestions.

## 2018-11-26 NOTE — TELEPHONE ENCOUNTER
Please let him know that the cheapest alternative I could get it xigduo xr 5/1000, 2 tabs w bkfast for 160 dollars per month. I understand about cost problems so if he can't afford stick with metformin and glimepiride.   He is ok with metformin and Glimepride  Refills I guess

## 2018-11-26 NOTE — TELEPHONE ENCOUNTER
Please let him know that the cheapest alternative I could get it xigduo xr 5/1000, 2 tabs w bkfast for 160 dollars per month. I understand about cost problems so if he can't afford stick with metformin and glimepiride.     Still stop losartan hctz and use losartan only     --    invokana 400  trulicity 400  xigduo 160       ----- Message from Abiola Haile MA sent at 11/26/2018 10:48 AM [facility] -----  Synjardy is covered by his insurance Medicare but it leaves him a $400.00 copay. Please change.  Thanks, Abiola

## 2019-01-22 RX ORDER — SPIRONOLACTONE 25 MG/1
25 TABLET ORAL DAILY
Qty: 90 TABLET | Refills: 3 | Status: SHIPPED | OUTPATIENT
Start: 2019-01-22 | End: 2020-02-24

## 2019-01-22 RX ORDER — SIMVASTATIN 20 MG
20 TABLET ORAL
Qty: 30 TABLET | Refills: 6 | Status: SHIPPED | OUTPATIENT
Start: 2019-01-22 | End: 2020-02-24 | Stop reason: SDUPTHER

## 2019-01-22 RX ORDER — ISOSORBIDE MONONITRATE 30 MG/1
30 TABLET, EXTENDED RELEASE ORAL DAILY
Qty: 30 TABLET | Refills: 6 | Status: SHIPPED | OUTPATIENT
Start: 2019-01-22 | End: 2019-10-01 | Stop reason: SDUPTHER

## 2019-01-22 RX ORDER — CARVEDILOL 6.25 MG/1
6.25 TABLET ORAL DAILY
Qty: 90 TABLET | Refills: 3 | Status: SHIPPED | OUTPATIENT
Start: 2019-01-22 | End: 2020-01-27

## 2019-01-22 RX ORDER — FUROSEMIDE 20 MG/1
20 TABLET ORAL DAILY
Qty: 30 TABLET | Refills: 6 | Status: SHIPPED | OUTPATIENT
Start: 2019-01-22 | End: 2019-10-01

## 2019-01-22 RX ORDER — CLOPIDOGREL BISULFATE 75 MG/1
75 TABLET ORAL DAILY
Qty: 90 TABLET | Refills: 3 | Status: SHIPPED | OUTPATIENT
Start: 2019-01-22 | End: 2020-01-27

## 2019-01-22 RX ORDER — LOSARTAN POTASSIUM 100 MG/1
100 TABLET ORAL DAILY
Qty: 30 TABLET | Refills: 11 | Status: SHIPPED | OUTPATIENT
Start: 2019-01-22 | End: 2020-02-24

## 2019-01-22 RX ORDER — ISOSORBIDE MONONITRATE 30 MG/1
TABLET, EXTENDED RELEASE ORAL
Qty: 30 TABLET | Refills: 6 | Status: SHIPPED | OUTPATIENT
Start: 2019-01-22 | End: 2020-02-24

## 2019-02-20 ENCOUNTER — OFFICE VISIT (OUTPATIENT)
Dept: OTOLARYNGOLOGY | Facility: CLINIC | Age: 64
End: 2019-02-20

## 2019-02-20 VITALS — OXYGEN SATURATION: 98 % | HEIGHT: 72 IN | BODY MASS INDEX: 42.66 KG/M2 | WEIGHT: 315 LBS

## 2019-02-20 DIAGNOSIS — T16.2XXA FOREIGN BODY OF LEFT EAR, INITIAL ENCOUNTER: Primary | ICD-10-CM

## 2019-02-20 PROCEDURE — 69200 CLEAR OUTER EAR CANAL: CPT | Performed by: OTOLARYNGOLOGY

## 2019-02-20 NOTE — PROGRESS NOTES
Subjective   Aramis Alvarez is a 63 y.o. male.       History of Present Illness   Patient states the end of a cotton tip swab came off in his left ear 9 days ago.  Attempts were made to remove it at an urgent care and were unsuccessful.      The following portions of the patient's history were reviewed and updated as appropriate: allergies, current medications, past family history, past medical history, past social history, past surgical history and problem list.     reports that he quit smoking about 7 years ago. He has a 90.00 pack-year smoking history. he has never used smokeless tobacco. He reports that he does not use drugs.   Patient is not a tobacco user and has not been counseled for use of tobacco products      Review of Systems        Objective   Physical Exam  Right ear no discharge.  Tympanic membrane intact no infection or effusion.  Left ear no discharge.  Foreign body located medially and is removed with forceps under microscopic visualization.  This is consistent with the end of a cotton tip swab.  Tympanic membrane appears intact.      Assessment/Plan   Aramis was seen today for foreign body in ear.    Diagnoses and all orders for this visit:    Foreign body of left ear, initial encounter        Plan: Foreign body removed as described above.  May follow-up with me as needed.

## 2019-03-26 ENCOUNTER — OFFICE VISIT (OUTPATIENT)
Dept: CARDIOLOGY | Facility: CLINIC | Age: 64
End: 2019-03-26

## 2019-03-26 VITALS
BODY MASS INDEX: 42.66 KG/M2 | SYSTOLIC BLOOD PRESSURE: 128 MMHG | WEIGHT: 315 LBS | HEIGHT: 72 IN | HEART RATE: 64 BPM | OXYGEN SATURATION: 97 % | DIASTOLIC BLOOD PRESSURE: 78 MMHG

## 2019-03-26 DIAGNOSIS — E11.59 HYPERTENSION ASSOCIATED WITH DIABETES (HCC): ICD-10-CM

## 2019-03-26 DIAGNOSIS — I25.10 ATHEROSCLEROSIS OF NATIVE CORONARY ARTERY OF NATIVE HEART WITHOUT ANGINA PECTORIS: Primary | ICD-10-CM

## 2019-03-26 DIAGNOSIS — I15.2 HYPERTENSION ASSOCIATED WITH DIABETES (HCC): ICD-10-CM

## 2019-03-26 PROCEDURE — 99214 OFFICE O/P EST MOD 30 MIN: CPT | Performed by: INTERNAL MEDICINE

## 2019-03-26 NOTE — PROGRESS NOTES
Aramis Alvarez  63 y.o. male    2019  1. Atherosclerosis of native coronary artery of native heart without angina pectoris    2. Hypertension associated with diabetes (CMS/HCC)        History of Present Illness  Mr. Alvarez is here for follow-up of his above stated problems.  He denied any chest pain, shortness of breath, palpitation, dizziness or syncope.  He was compliant with his medications.  No signs of congestive heart failure was noted.  His blood pressure was in the normal range. He is morbidly obese and we had a discussion about weight reduction.  He admits to not watching his diet.  No gastrointestinal, urinary or neurological symptoms are noted.        SUBJECTIVE    Allergies   Allergen Reactions   • Atorvastatin Other (See Comments)     Leg cramps/muscle cramps         Past Medical History:   Diagnosis Date   • Coronary atherosclerosis of native coronary artery    • Diabetes mellitus (CMS/HCC)    • Hyperlipidemia    • Hypertension    • Obesity    • Old myocardial infarction          Past Surgical History:   Procedure Laterality Date   • CARDIAC SURGERY      pt states he had one stent put in         Family History   Problem Relation Age of Onset   • Depression Mother    • Diabetes Mother    • Drug abuse Mother    • Heart disease Mother    • Hyperlipidemia Mother    • Alcohol abuse Father    • Cancer Father    • COPD Father    • Hypertension Father    • Early death Brother          Social History     Socioeconomic History   • Marital status:      Spouse name: Not on file   • Number of children: Not on file   • Years of education: Not on file   • Highest education level: Not on file   Tobacco Use   • Smoking status: Former Smoker     Packs/day: 2.00     Years: 45.00     Pack years: 90.00     Last attempt to quit: 2011     Years since quittin.8   • Smokeless tobacco: Never Used   Substance and Sexual Activity   • Alcohol use: No     Frequency: Never   • Drug use: No   • Sexual activity:  "Defer         Current Outpatient Medications   Medication Sig Dispense Refill   • aspirin 325 MG tablet Take 325 mg by mouth Daily.     • Calcium Carb-Cholecalciferol (CALCIUM-VITAMIN D) 500-200 MG-UNIT per tablet One tab daily , any brand any dose otc 30 tablet 11   • carvedilol (COREG) 6.25 MG tablet Take 1 tablet by mouth Daily. 90 tablet 3   • clopidogrel (PLAVIX) 75 MG tablet Take 1 tablet by mouth Daily. 90 tablet 3   • dapagliflozin-metformin HCl ER (XIGDUO XR) 5-1000 MG tablet Take 2 tablets by mouth Daily With Breakfast. 60 tablet 11   • diphenhydrAMINE (BENADRYL) 25 mg capsule Take 25 mg by mouth At Night As Needed for Itching.     • furosemide (LASIX) 20 MG tablet Take 1 tablet by mouth Daily. 30 tablet 6   • isosorbide mononitrate (IMDUR) 30 MG 24 hr tablet TAKE 1 TABLET BY MOUTH ONCE DAILY 30 tablet 6   • isosorbide mononitrate (IMDUR) 30 MG 24 hr tablet Take 1 tablet by mouth Daily. 30 tablet 6   • losartan (COZAAR) 100 MG tablet Take 1 tablet by mouth Daily. 30 tablet 11   • simvastatin (ZOCOR) 20 MG tablet Take 1 tablet by mouth every night at bedtime. 30 tablet 6   • spironolactone (ALDACTONE) 25 MG tablet Take 1 tablet by mouth Daily. 90 tablet 3     No current facility-administered medications for this visit.          OBJECTIVE    /78   Pulse 64   Ht 182.9 cm (72.01\")   Wt (!) 167 kg (369 lb)   SpO2 97%   BMI 50.03 kg/m²         Review of Systems     Constitutional:  Denies recent weight loss, weight gain, fever or chills     HENT:  Denies any hearing loss, epistaxis, hoarseness, or difficulty speaking.     Eyes: Wears eyeglasses or contact lenses     Respiratory:  Denies dyspnea with exertion,no cough, wheezing, or hemoptysis.     Cardiovascular: Negative for palpations, chest pain, orthopnea, PND    Gastrointestinal:  Denies change in bowel habits, dyspepsia, ulcer disease, hematochezia, or melena.     Endocrine: Negative for cold intolerance, heat intolerance, polydipsia, polyphagia " and polyuria.     Genitourinary: Negative.      Musculoskeletal: DJD    Skin:  Denies any change in hair or nails, rashes, or skin lesions.     Allergic/Immunologic: Negative.  Negative for environmental allergies, food allergies and immunocompromised state.     Neurological:  Denies any history of recurrent headaches, strokes, TIA, or seizure disorder.     Hematological: Denies any food allergies, seasonal allergies, bleeding disorders, or lymphadenopathy.     Psychiatric/Behavioral: Denies any history of depression, substance abuse, or change in cognitive function.         Physical Exam     Constitutional: Cooperative, alert and oriented,  in no acute distress. Obese    HENT:   Head: Normocephalic, normal hair patterns, no masses or tenderness.  Ears, Nose, and Throat: No gross abnormalities. No pallor or cyanosis.   Eyes: EOMS intact, PERRL, conjunctivae and lids unremarkable. Fundoscopic exam and visual fields not performed.   Neck: No palpable masses or adenopathy, no thyromegaly, no JVD, carotid pulses are full and equal bilaterally and without  Bruits.     Cardiovascular: Regular rhythm, S1 and S2 normal, no S3 or S4. No murmurs, gallops, or rubs detected.     Pulmonary/Chest: Chest: normal symmetry,  normal respiratory excursion, no intercostal retraction, no use of accessory muscles.            Pulmonary: Normal breath sounds. No rales or ronchi.    Abdominal: Abdomen soft, bowel sounds normoactive, no masses, no hepatosplenomegaly, non-tender, no bruits.     Musculoskeletal: No deformities, clubbing, cyanosis, erythema, or edema observed.     Neurological: No gross motor or sensory deficits noted, affect appropriate, oriented to time, person, place.     Skin: Warm and dry to the touch, no apparent skin lesions or masses noted.     Psychiatric: He has a normal mood and affect. His behavior is normal. Judgment and thought content normal.         Procedures      Lab Results   Component Value Date    WBC 8.88  05/16/2017    HGB 12.7 (L) 05/16/2017    HCT 36.3 (L) 05/16/2017    MCV 90.1 05/16/2017     (L) 05/16/2017     Lab Results   Component Value Date    GLUCOSE 185 (H) 05/16/2017    BUN 22 (H) 05/16/2017    CREATININE 0.98 05/16/2017    EGFRIFNONA 77 05/16/2017    BCR 22.4 05/16/2017    CO2 23.0 05/16/2017    CALCIUM 9.8 05/16/2017    ALBUMIN 3.40 05/16/2017    AST 78 (H) 05/16/2017    ALT 61 05/16/2017     No results found for: CHOL  No results found for: TRIG  No results found for: HDL  No components found for: LDLCALC  No results found for: LDL  No results found for: HDLLDLRATIO  No components found for: CHOLHDL  No results found for: HGBA1C  No results found for: TSH, X6RZKER, A2LUMYT, THYROIDAB        ASSESSMENT AND PLAN  Mr. Alvarez is stable at this time with no clinical evidence of angina, arrhythmia or congestive heart failure.  He indicates that he will not be able to watch his diet and does not wish to change this.  I have continued antiplatelet therapy with aspirin and Plavix, antihypertensive therapy with Coreg, losartan, Aldactone and antianginal therapy with isosorbide mononitrate lipid-lowering therapy with simvastatin.    Aramis was seen today for follow-up.    Diagnoses and all orders for this visit:    Atherosclerosis of native coronary artery of native heart without angina pectoris    Hypertension associated with diabetes (CMS/[facility])        Patient's Body mass index is 50.03 kg/m². BMI is above normal parameters. Recommendations include: exercise counseling and nutrition counseling.      Robinson Fierro MD  3/26/2019  6:33 PM

## 2019-05-21 RX ORDER — SIMVASTATIN 20 MG
TABLET ORAL
Qty: 30 TABLET | Refills: 6 | Status: SHIPPED | OUTPATIENT
Start: 2019-05-21 | End: 2019-10-01

## 2019-08-20 RX ORDER — SIMVASTATIN 20 MG
TABLET ORAL
Qty: 30 TABLET | Refills: 6 | Status: SHIPPED | OUTPATIENT
Start: 2019-08-20 | End: 2019-10-01 | Stop reason: SDUPTHER

## 2019-10-01 ENCOUNTER — OFFICE VISIT (OUTPATIENT)
Dept: CARDIOLOGY | Facility: CLINIC | Age: 64
End: 2019-10-01

## 2019-10-01 VITALS
WEIGHT: 315 LBS | SYSTOLIC BLOOD PRESSURE: 130 MMHG | OXYGEN SATURATION: 98 % | DIASTOLIC BLOOD PRESSURE: 78 MMHG | BODY MASS INDEX: 42.66 KG/M2 | HEIGHT: 72 IN | HEART RATE: 58 BPM

## 2019-10-01 DIAGNOSIS — E11.59 HYPERTENSION ASSOCIATED WITH DIABETES (HCC): ICD-10-CM

## 2019-10-01 DIAGNOSIS — E11.69 MIXED DIABETIC HYPERLIPIDEMIA ASSOCIATED WITH TYPE 2 DIABETES MELLITUS (HCC): ICD-10-CM

## 2019-10-01 DIAGNOSIS — E78.2 MIXED DIABETIC HYPERLIPIDEMIA ASSOCIATED WITH TYPE 2 DIABETES MELLITUS (HCC): ICD-10-CM

## 2019-10-01 DIAGNOSIS — I15.2 HYPERTENSION ASSOCIATED WITH DIABETES (HCC): ICD-10-CM

## 2019-10-01 DIAGNOSIS — I25.2 OLD MYOCARDIAL INFARCTION: ICD-10-CM

## 2019-10-01 DIAGNOSIS — R06.09 DYSPNEA ON EXERTION: ICD-10-CM

## 2019-10-01 DIAGNOSIS — I25.10 ATHEROSCLEROSIS OF NATIVE CORONARY ARTERY OF NATIVE HEART WITHOUT ANGINA PECTORIS: Primary | ICD-10-CM

## 2019-10-01 PROCEDURE — 99214 OFFICE O/P EST MOD 30 MIN: CPT | Performed by: INTERNAL MEDICINE

## 2019-10-01 RX ORDER — BUMETANIDE 2 MG/1
1 TABLET ORAL DAILY
Qty: 30 TABLET | Refills: 5 | Status: SHIPPED | OUTPATIENT
Start: 2019-10-01 | End: 2020-04-17 | Stop reason: SDUPTHER

## 2019-10-01 RX ORDER — GLIMEPIRIDE 1 MG/1
TABLET ORAL
Refills: 1 | COMMUNITY
Start: 2019-08-20

## 2019-10-01 NOTE — PROGRESS NOTES
Aramis Alvarez  63 y.o. male    10/01/2019  1. Atherosclerosis of native coronary artery of native heart without angina pectoris    2. Hypertension associated with diabetes (CMS/HCC)    3. Mixed diabetic hyperlipidemia associated with type 2 diabetes mellitus (CMS/HCC)    4. Old myocardial infarction        History of Present Illness  Mr. Alvarez is here for follow-up of his above-stated problems.  He denies any chest pain but does have NYHA  class II dyspnea on exertion with no PND or orthopnea.  His predominant complaint is swelling in the lower extremities, which he believes has gotten worse.  He has been on Lasix and Aldactone for a long time without much improvement.  He admits to not watching his diet closely but has been compliant with his medications.    Cardiac catheterization in June 2011 showed chronic total occlusion of the right coronary artery and successful PCI to critical lesion in the left anterior descending coronary artery.  Left Circumflex Coronary Artery and ramus intermedius had noncritical disease.    Exam today did not reveal any bronchospasm or signs of congestive heart failure.  His blood pressure was in the normal range.    SUBJECTIVE    Allergies   Allergen Reactions   • Atorvastatin Other (See Comments)     Leg cramps/muscle cramps         Past Medical History:   Diagnosis Date   • Coronary atherosclerosis of native coronary artery    • Diabetes mellitus (CMS/HCC)    • Hyperlipidemia    • Hypertension    • Obesity    • Old myocardial infarction          Past Surgical History:   Procedure Laterality Date   • CARDIAC SURGERY      pt states he had one stent put in         Family History   Problem Relation Age of Onset   • Depression Mother    • Diabetes Mother    • Drug abuse Mother    • Heart disease Mother    • Hyperlipidemia Mother    • Alcohol abuse Father    • Cancer Father    • COPD Father    • Hypertension Father    • Early death Brother          Social History     Socioeconomic  "History   • Marital status:      Spouse name: Not on file   • Number of children: Not on file   • Years of education: Not on file   • Highest education level: Not on file   Tobacco Use   • Smoking status: Former Smoker     Packs/day: 2.00     Years: 45.00     Pack years: 90.00     Last attempt to quit: 2011     Years since quittin.3   • Smokeless tobacco: Never Used   Substance and Sexual Activity   • Alcohol use: No     Frequency: Never   • Drug use: No   • Sexual activity: Defer         Current Outpatient Medications   Medication Sig Dispense Refill   • aspirin 325 MG tablet Take 325 mg by mouth Daily.     • Calcium Carb-Cholecalciferol (CALCIUM-VITAMIN D) 500-200 MG-UNIT per tablet One tab daily , any brand any dose otc 30 tablet 11   • carvedilol (COREG) 6.25 MG tablet Take 1 tablet by mouth Daily. 90 tablet 3   • clopidogrel (PLAVIX) 75 MG tablet Take 1 tablet by mouth Daily. 90 tablet 3   • dapagliflozin-metformin HCl ER (XIGDUO XR) 5-1000 MG tablet Take 2 tablets by mouth Daily With Breakfast. 60 tablet 11   • diphenhydrAMINE (BENADRYL) 25 mg capsule Take 25 mg by mouth At Night As Needed for Itching.     • furosemide (LASIX) 20 MG tablet Take 1 tablet by mouth Daily. 30 tablet 6   • glimepiride (AMARYL) 1 MG tablet   1   • isosorbide mononitrate (IMDUR) 30 MG 24 hr tablet TAKE 1 TABLET BY MOUTH ONCE DAILY 30 tablet 6   • isosorbide mononitrate (IMDUR) 30 MG 24 hr tablet Take 1 tablet by mouth Daily. 30 tablet 6   • losartan (COZAAR) 100 MG tablet Take 1 tablet by mouth Daily. 30 tablet 11   • metFORMIN (GLUCOPHAGE) 1000 MG tablet   1   • simvastatin (ZOCOR) 20 MG tablet Take 1 tablet by mouth every night at bedtime. 30 tablet 6   • spironolactone (ALDACTONE) 25 MG tablet Take 1 tablet by mouth Daily. 90 tablet 3     No current facility-administered medications for this visit.          OBJECTIVE    /78   Pulse 58   Ht 182.9 cm (72.01\")   Wt (!) 167 kg (368 lb)   SpO2 98%   BMI 49.90 " kg/m²         Review of Systems     Constitutional:  Denies recent weight loss, weight gain, fever or chills     HENT:  Denies any hearing loss, epistaxis, hoarseness, or difficulty speaking.     Eyes: Wears eyeglasses or contact lenses     Respiratory: Dyspnea with exertion,no cough, wheezing, or hemoptysis.     Cardiovascular: Negative for palpations, chest pain, orthopnea, PND.  Bilateral leg edema    Gastrointestinal:  Denies change in bowel habits, dyspepsia, ulcer disease, hematochezia, or melena.     Endocrine: Negative for cold intolerance, heat intolerance, polydipsia, polyphagia and polyuria.    Genitourinary: Negative.      Musculoskeletal: Denies any history of arthritic symptoms or back problems     Skin:  Denies any change in hair or nails, rashes, or skin lesions.     Allergic/Immunologic: Negative.  Negative for environmental allergies, food allergies and immunocompromised state.     Neurological:  Denies any history of recurrent headaches, strokes, TIA, or seizure disorder.     Hematological: Denies any food allergies, seasonal allergies, bleeding disorders, or lymphadenopathy.     Psychiatric/Behavioral: Denies any history of depression, substance abuse, or change in cognitive function.         Physical Exam     Constitutional: Cooperative, alert and oriented,  in no acute distress. Morbidly obese    HENT:   Head: Normocephalic, normal hair patterns, no masses or tenderness.  Ears, Nose, and Throat: No gross abnormalities. No pallor or cyanosis.   Eyes: EOMS intact, PERRL, conjunctivae and lids unremarkable. Fundoscopic exam and visual fields not performed.   Neck: No palpable masses or adenopathy, no thyromegaly, no JVD, carotid pulses are full and equal bilaterally and without  Bruits.     Cardiovascular: Regular rhythm, S1 and S2 normal, no S3 or S4.No murmurs, gallops, or rubs detected.     Pulmonary/Chest: Chest: normal symmetry,  normal respiratory excursion, no intercostal retraction, no use  of accessory muscles.            Pulmonary: Normal breath sounds. No rales or ronchi.    Abdominal: Abdomen soft, bowel sounds normoactive, no masses, no hepatosplenomegaly, non-tender, no bruits.     Musculoskeletal: No deformities, clubbing, cyanosis, erythema. 2+ pitting edema    Neurological: No gross motor or sensory deficits noted, affect appropriate, oriented to time, person, place.     Skin: Warm and dry to the touch, no apparent skin lesions or masses noted.     Psychiatric: He has a normal mood and affect. His behavior is normal. Judgment and thought content normal.         Procedures      Lab Results   Component Value Date    WBC 8.88 05/16/2017    HGB 12.7 (L) 05/16/2017    HCT 36.3 (L) 05/16/2017    MCV 90.1 05/16/2017     (L) 05/16/2017     Lab Results   Component Value Date    GLUCOSE 185 (H) 05/16/2017    BUN 22 (H) 05/16/2017    CREATININE 0.98 05/16/2017    EGFRIFNONA 77 05/16/2017    BCR 22.4 05/16/2017    CO2 23.0 05/16/2017    CALCIUM 9.8 05/16/2017    ALBUMIN 3.40 05/16/2017    AST 78 (H) 05/16/2017    ALT 61 05/16/2017     No results found for: CHOL  No results found for: TRIG  No results found for: HDL  No components found for: LDLCALC  No results found for: LDL  No results found for: HDLLDLRATIO  No components found for: CHOLHDL  Lab Results   Component Value Date    HGBA1C 5.9 07/18/2019     No results found for: TSH, H6PNRFG, J6VCQAL, THYROIDAB        ASSESSMENT AND PLAN  Mr. Alvarez has multiple medical issues as discussed under history of present illness.  He does have chronic leg edema, the exact etiology of which is unclear.  Suspicion for congestive heart failure is low.  However to make sure that his LV systolic function and right heart pressures are within normal limits and echocardiogram is being arranged.  He most likely has sleep apnea but does not wish to get this checked.  He was recommended compression stockings but he believes that he will not be able to wear it  regularly and will not use it.  Obesity, venous stasis could be contributing to this.  I have discontinued Lasix and started him on bumetanide 1 mg daily along with Aldactone.  Antiplatelet therapy with aspirin and Plavix and management of hypertension with Coreg, losartan and antianginal therapy with isosorbide mononitrate and lipid-lowering therapy with simvastatin has been continued.    Aramis was seen today for follow-up and leg swelling.    Diagnoses and all orders for this visit:    Atherosclerosis of native coronary artery of native heart without angina pectoris    Hypertension associated with diabetes (CMS/HCC)    Mixed diabetic hyperlipidemia associated with type 2 diabetes mellitus (CMS/HCC)    Old myocardial infarction        Patient's Body mass index is 49.9 kg/m². BMI is above normal parameters. Recommendations include: exercise counseling and nutrition counseling.  Patient is a non-smoker    Robinson Fierro MD  10/1/2019  11:28 AM

## 2019-10-25 ENCOUNTER — DOCUMENTATION (OUTPATIENT)
Dept: CARDIOLOGY | Facility: CLINIC | Age: 64
End: 2019-10-25

## 2019-10-25 NOTE — PROGRESS NOTES
Unable to lvm , per dr valle echo showed aorta root was slightly dilated . Dr valle stated we will monitor closely.

## 2020-01-27 RX ORDER — CARVEDILOL 6.25 MG/1
TABLET ORAL
Qty: 180 TABLET | Refills: 1 | Status: SHIPPED | OUTPATIENT
Start: 2020-01-27 | End: 2020-04-17

## 2020-01-27 RX ORDER — CLOPIDOGREL BISULFATE 75 MG/1
TABLET ORAL
Qty: 90 TABLET | Refills: 1 | Status: SHIPPED | OUTPATIENT
Start: 2020-01-27 | End: 2020-04-17 | Stop reason: SDUPTHER

## 2020-02-24 RX ORDER — SIMVASTATIN 20 MG
20 TABLET ORAL
Qty: 30 TABLET | Refills: 6 | Status: SHIPPED | OUTPATIENT
Start: 2020-02-24 | End: 2020-04-17 | Stop reason: SDUPTHER

## 2020-02-24 RX ORDER — ISOSORBIDE MONONITRATE 30 MG/1
TABLET, EXTENDED RELEASE ORAL
Qty: 30 TABLET | Refills: 0 | Status: SHIPPED | OUTPATIENT
Start: 2020-02-24 | End: 2020-03-23

## 2020-02-24 RX ORDER — LOSARTAN POTASSIUM 100 MG/1
TABLET ORAL
Qty: 90 TABLET | Refills: 0 | Status: SHIPPED | OUTPATIENT
Start: 2020-02-24 | End: 2020-04-17 | Stop reason: SDUPTHER

## 2020-02-24 RX ORDER — SPIRONOLACTONE 25 MG/1
TABLET ORAL
Qty: 90 TABLET | Refills: 0 | Status: SHIPPED | OUTPATIENT
Start: 2020-02-24 | End: 2020-04-17 | Stop reason: SDUPTHER

## 2020-02-24 NOTE — TELEPHONE ENCOUNTER
Called pt back, the refills were done. They are waiting for  To sign          ----- Message from Shu Lemus sent at 2020  9:07 AM CST -----  Regarding: CALL BACK  Contact: [phone]  NARCISA HARRISON MARILYN [birth year] WANTED A CALL BACK @ [phone] TO TALK ABOUT HIS MED REFILLS. THXS

## 2020-03-23 RX ORDER — ISOSORBIDE MONONITRATE 30 MG/1
30 TABLET, EXTENDED RELEASE ORAL DAILY
Qty: 90 TABLET | Refills: 3 | Status: SHIPPED | OUTPATIENT
Start: 2020-03-23 | End: 2020-04-17 | Stop reason: SDUPTHER

## 2020-03-23 RX ORDER — ISOSORBIDE MONONITRATE 30 MG/1
TABLET, EXTENDED RELEASE ORAL
Qty: 30 TABLET | Refills: 6 | Status: SHIPPED | OUTPATIENT
Start: 2020-03-23 | End: 2020-03-23 | Stop reason: SDUPTHER

## 2020-04-17 ENCOUNTER — OFFICE VISIT (OUTPATIENT)
Dept: CARDIOLOGY | Facility: CLINIC | Age: 65
End: 2020-04-17

## 2020-04-17 VITALS
BODY MASS INDEX: 40.43 KG/M2 | HEIGHT: 74 IN | HEART RATE: 60 BPM | DIASTOLIC BLOOD PRESSURE: 70 MMHG | SYSTOLIC BLOOD PRESSURE: 130 MMHG | WEIGHT: 315 LBS

## 2020-04-17 DIAGNOSIS — I25.10 ATHEROSCLEROSIS OF NATIVE CORONARY ARTERY OF NATIVE HEART WITHOUT ANGINA PECTORIS: ICD-10-CM

## 2020-04-17 DIAGNOSIS — E11.59 HYPERTENSION ASSOCIATED WITH DIABETES (HCC): Primary | ICD-10-CM

## 2020-04-17 DIAGNOSIS — E66.01 CLASS 3 SEVERE OBESITY DUE TO EXCESS CALORIES WITH SERIOUS COMORBIDITY AND BODY MASS INDEX (BMI) OF 45.0 TO 49.9 IN ADULT (HCC): ICD-10-CM

## 2020-04-17 DIAGNOSIS — I25.2 OLD MYOCARDIAL INFARCTION: ICD-10-CM

## 2020-04-17 DIAGNOSIS — I15.2 HYPERTENSION ASSOCIATED WITH DIABETES (HCC): Primary | ICD-10-CM

## 2020-04-17 DIAGNOSIS — R06.09 DYSPNEA ON EXERTION: ICD-10-CM

## 2020-04-17 PROCEDURE — 99442 PR PHYS/QHP TELEPHONE EVALUATION 11-20 MIN: CPT | Performed by: INTERNAL MEDICINE

## 2020-04-17 RX ORDER — ISOSORBIDE MONONITRATE 30 MG/1
30 TABLET, EXTENDED RELEASE ORAL DAILY
Qty: 90 TABLET | Refills: 3 | Status: SHIPPED | OUTPATIENT
Start: 2020-04-17 | End: 2021-06-21

## 2020-04-17 RX ORDER — CARVEDILOL 6.25 MG/1
6.25 TABLET ORAL 2 TIMES DAILY
Qty: 180 TABLET | Refills: 3 | Status: SHIPPED | OUTPATIENT
Start: 2020-04-17 | End: 2021-01-26

## 2020-04-17 RX ORDER — BUMETANIDE 2 MG/1
1 TABLET ORAL DAILY
Qty: 45 TABLET | Refills: 3 | Status: SHIPPED | OUTPATIENT
Start: 2020-04-17 | End: 2021-06-21

## 2020-04-17 RX ORDER — LOSARTAN POTASSIUM 100 MG/1
100 TABLET ORAL DAILY
Qty: 90 TABLET | Refills: 3 | Status: SHIPPED | OUTPATIENT
Start: 2020-04-17 | End: 2021-05-16 | Stop reason: SDUPTHER

## 2020-04-17 RX ORDER — SIMVASTATIN 20 MG
20 TABLET ORAL
Qty: 90 TABLET | Refills: 3 | Status: SHIPPED | OUTPATIENT
Start: 2020-04-17 | End: 2020-11-25

## 2020-04-17 RX ORDER — CLOPIDOGREL BISULFATE 75 MG/1
75 TABLET ORAL DAILY
Qty: 90 TABLET | Refills: 3 | Status: SHIPPED | OUTPATIENT
Start: 2020-04-17 | End: 2021-04-26

## 2020-04-17 RX ORDER — SPIRONOLACTONE 25 MG/1
25 TABLET ORAL DAILY
Qty: 90 TABLET | Refills: 3 | Status: SHIPPED | OUTPATIENT
Start: 2020-04-17 | End: 2021-05-17

## 2020-04-17 NOTE — PROGRESS NOTES
Aramis Alvarez  64 y.o. male    1. Hypertension associated with diabetes (CMS/HCC)    2. Atherosclerosis of native coronary artery of native heart without angina pectoris    3. Old myocardial infarction    4. Dyspnea on exertion    5. Class 3 severe obesity due to excess calories with serious comorbidity and body mass index (BMI) of 45.0 to 49.9 in adult (CMS/HCC)        History of Present Illness:  A Telephone visit was performed after obtaining consent:  Mr. Alvarez denied any chest pain but does have NYHA  class II dyspnea on exertion with no PND or orthopnea. He admits to not watching his diet closely but has been compliant with his medications.    Cardiac catheterization in June 2011 showed chronic total occlusion of the right coronary artery and successful PCI to critical lesion in the left anterior descending coronary artery.  Left Circumflex Coronary Artery and ramus intermedius had noncritical disease.    Echocardiogram in October 2019 showed:  · The study is technically difficult for diagnosis.  · The left ventricular cavity is borderline dilated.  · Left ventricular wall thickness is consistent with mild-to-moderate concentric hypertrophy.  · Estimated EF = 56%.  · Left ventricular systolic function is normal.  · Left ventricular diastolic dysfunction (grade I) consistent with impaired relaxation.  · Right ventricular cavity is moderately dilated. Systolic function normal  · Moderate dilation of the aortic root is present (4.5 cms)    His pulse rate and blood pressure were in the normal range..    SUBJECTIVE    Allergies   Allergen Reactions   • Atorvastatin Other (See Comments)     Leg cramps/muscle cramps         Past Medical History:   Diagnosis Date   • Coronary atherosclerosis of native coronary artery    • Diabetes mellitus (CMS/HCC)    • Hyperlipidemia    • Hypertension    • Obesity    • Old myocardial infarction          Past Surgical History:   Procedure Laterality Date   • CARDIAC SURGERY      pt  states he had one stent put in         Family History   Problem Relation Age of Onset   • Depression Mother    • Diabetes Mother    • Drug abuse Mother    • Heart disease Mother    • Hyperlipidemia Mother    • Alcohol abuse Father    • Cancer Father    • COPD Father    • Hypertension Father    • Early death Brother          Social History     Socioeconomic History   • Marital status:      Spouse name: Not on file   • Number of children: Not on file   • Years of education: Not on file   • Highest education level: Not on file   Tobacco Use   • Smoking status: Former Smoker     Packs/day: 2.00     Years: 45.00     Pack years: 90.00     Last attempt to quit: 2011     Years since quittin.8   • Smokeless tobacco: Never Used   Substance and Sexual Activity   • Alcohol use: No     Frequency: Never   • Drug use: No   • Sexual activity: Defer         Current Outpatient Medications   Medication Sig Dispense Refill   • aspirin 325 MG tablet Take 325 mg by mouth Daily.     • bumetanide (BUMEX) 2 MG tablet Take 0.5 tablets by mouth Daily. 45 tablet 3   • Calcium Carb-Cholecalciferol (CALCIUM-VITAMIN D) 500-200 MG-UNIT per tablet One tab daily , any brand any dose otc 30 tablet 11   • clopidogrel (PLAVIX) 75 MG tablet Take 1 tablet by mouth Daily. 90 tablet 3   • dapagliflozin-metformin HCl ER (XIGDUO XR) 5-1000 MG tablet Take 2 tablets by mouth Daily With Breakfast. 60 tablet 11   • diphenhydrAMINE (BENADRYL) 25 mg capsule Take 25 mg by mouth At Night As Needed for Itching.     • glimepiride (AMARYL) 1 MG tablet   1   • isosorbide mononitrate (IMDUR) 30 MG 24 hr tablet Take 1 tablet by mouth Daily. 90 tablet 3   • losartan (COZAAR) 100 MG tablet Take 1 tablet by mouth Daily. 90 tablet 3   • metFORMIN (GLUCOPHAGE) 1000 MG tablet   1   • simvastatin (ZOCOR) 20 MG tablet Take 1 tablet by mouth every night at bedtime. 90 tablet 3   • spironolactone (ALDACTONE) 25 MG tablet Take 1 tablet by mouth Daily. 90 tablet 3   •  "carvedilol (COREG) 6.25 MG tablet Take 1 tablet by mouth 2 (Two) Times a Day. 180 tablet 3     No current facility-administered medications for this visit.          OBJECTIVE    /70   Pulse 60   Ht 188 cm (74\")   Wt (!) 167 kg (368 lb)   BMI 47.25 kg/m²         Review of Systems     Constitutional:  Denies recent weight loss, weight gain, fever or chills     HENT:  Denies any hearing loss, epistaxis, hoarseness, or difficulty speaking.     Eyes: Wears eyeglasses or contact lenses     Respiratory: Dyspnea with exertion,no cough, wheezing, or hemoptysis.     Cardiovascular: Negative for palpations, chest pain, orthopnea, PND.  Bilateral leg edema    Gastrointestinal:  Denies change in bowel habits, dyspepsia, ulcer disease, hematochezia, or melena.     Endocrine: Negative for cold intolerance, heat intolerance, polydipsia, polyphagia and polyuria.    Genitourinary: Negative.      Musculoskeletal: Denies any history of arthritic symptoms or back problems     Skin:  Denies any change in hair or nails, rashes, or skin lesions.     Allergic/Immunologic: Negative.  Negative for environmental allergies, food allergies and immunocompromised state.     Neurological:  Denies any history of recurrent headaches, strokes, TIA, or seizure disorder.     Hematological: Denies any food allergies, seasonal allergies, bleeding disorders, or lymphadenopathy.     Psychiatric/Behavioral: Denies any history of depression, substance abuse, or change in cognitive function.           Lab Results   Component Value Date    WBC 8.88 05/16/2017    HGB 12.7 (L) 05/16/2017    HCT 36.3 (L) 05/16/2017    MCV 90.1 05/16/2017     (L) 05/16/2017     Lab Results   Component Value Date    GLUCOSE 185 (H) 05/16/2017    BUN 22 (H) 05/16/2017    CREATININE 0.98 05/16/2017    EGFRIFNONA 77 05/16/2017    BCR 22.4 05/16/2017    CO2 23.0 05/16/2017    CALCIUM 9.8 05/16/2017    ALBUMIN 3.40 05/16/2017    AST 78 (H) 05/16/2017    ALT 61 05/16/2017 "     No results found for: CHOL  No results found for: TRIG  No results found for: HDL  No components found for: LDLCALC  No results found for: LDL  No results found for: HDLLDLRATIO  No components found for: CHOLHDL  Lab Results   Component Value Date    HGBA1C 5.9 07/18/2019     No results found for: TSH, Q0FQGSC, C8MEREQ, THYROIDAB        ASSESSMENT AND PLAN  Mr. Alvarez has multiple medical issues as discussed under history of present illness. He most likely has sleep apnea but does not wish to get this checked.  He was recommended compression stockings but he believes that he will not be able to wear it regularly and will not use it.   I have continued bumetanide 1 mg daily along with Aldactone.  Antiplatelet therapy with aspirin and Plavix and management of hypertension with Coreg, losartan and antianginal therapy with isosorbide mononitrate and lipid-lowering therapy with simvastatin has been continued.  Medications were refilled.  He will be seen his primary care physician in July this year and blood work will be performed at that time.    Aramis was seen today for follow-up.    Diagnoses and all orders for this visit:    Hypertension associated with diabetes (CMS/[facility])    Atherosclerosis of native coronary artery of native heart without angina pectoris    Old myocardial infarction    Dyspnea on exertion    Class 3 severe obesity due to excess calories with serious comorbidity and body mass index (BMI) of 45.0 to 49.9 in adult (CMS/[facility])    Other orders  -     bumetanide (BUMEX) 2 MG tablet; Take 0.5 tablets by mouth Daily.  -     carvedilol (COREG) 6.25 MG tablet; Take 1 tablet by mouth 2 (Two) Times a Day.  -     clopidogrel (PLAVIX) 75 MG tablet; Take 1 tablet by mouth Daily.  -     isosorbide mononitrate (IMDUR) 30 MG 24 hr tablet; Take 1 tablet by mouth Daily.  -     losartan (COZAAR) 100 MG tablet; Take 1 tablet by mouth Daily.  -     simvastatin (ZOCOR) 20 MG tablet; Take 1 tablet by mouth every night at  bedtime.  -     spironolactone (ALDACTONE) 25 MG tablet; Take 1 tablet by mouth Daily.        Patient's Body mass index is 47.25 kg/m². BMI is above normal parameters. Recommendations include: exercise counseling and nutrition counseling.  Patient is a non-smoker    About 15 min was spent in the assessment and documentation of this patient.    Robinson Fierro MD  4/17/2020  11:31

## 2020-11-25 ENCOUNTER — OFFICE VISIT (OUTPATIENT)
Dept: CARDIOLOGY | Facility: CLINIC | Age: 65
End: 2020-11-25

## 2020-11-25 VITALS
BODY MASS INDEX: 40.43 KG/M2 | HEIGHT: 74 IN | HEART RATE: 69 BPM | OXYGEN SATURATION: 98 % | WEIGHT: 315 LBS | SYSTOLIC BLOOD PRESSURE: 136 MMHG | DIASTOLIC BLOOD PRESSURE: 76 MMHG

## 2020-11-25 DIAGNOSIS — I25.2 OLD MYOCARDIAL INFARCTION: ICD-10-CM

## 2020-11-25 DIAGNOSIS — I15.2 HYPERTENSION ASSOCIATED WITH DIABETES (HCC): Primary | ICD-10-CM

## 2020-11-25 DIAGNOSIS — E11.59 HYPERTENSION ASSOCIATED WITH DIABETES (HCC): Primary | ICD-10-CM

## 2020-11-25 DIAGNOSIS — E66.01 CLASS 3 SEVERE OBESITY DUE TO EXCESS CALORIES WITH SERIOUS COMORBIDITY AND BODY MASS INDEX (BMI) OF 45.0 TO 49.9 IN ADULT (HCC): ICD-10-CM

## 2020-11-25 DIAGNOSIS — I25.10 ATHEROSCLEROSIS OF NATIVE CORONARY ARTERY OF NATIVE HEART WITHOUT ANGINA PECTORIS: ICD-10-CM

## 2020-11-25 PROCEDURE — 93000 ELECTROCARDIOGRAM COMPLETE: CPT | Performed by: INTERNAL MEDICINE

## 2020-11-25 PROCEDURE — 99214 OFFICE O/P EST MOD 30 MIN: CPT | Performed by: INTERNAL MEDICINE

## 2020-11-25 RX ORDER — ERGOCALCIFEROL 1.25 MG/1
50000 CAPSULE ORAL
COMMUNITY
Start: 2020-08-14 | End: 2021-08-15

## 2020-11-25 RX ORDER — ROSUVASTATIN CALCIUM 20 MG/1
20 TABLET, COATED ORAL DAILY
Qty: 90 TABLET | Refills: 3 | Status: SHIPPED | OUTPATIENT
Start: 2020-11-25 | End: 2022-06-27 | Stop reason: SDUPTHER

## 2020-11-25 NOTE — PROGRESS NOTES
Aramis Alvarez  65 y.o. male      1. Hypertension associated with diabetes (CMS/[facility])    2. Atherosclerosis of native coronary artery of native heart without angina pectoris    3. Old myocardial infarction    4. Class 3 severe obesity due to excess calories with serious comorbidity and body mass index (BMI) of 45.0 to 49.9 in adult (CMS/[facility])        History of Present Illness  Mr. Alvarez is here for follow-up of his above-stated problems.  He has done quite well overall with no chest pain but does have chronic dyspnea on exertion which is multifactorial.  The patient had lab work done in August 2020 and his triglycerides were elevated in the 475 and LDL was 138.  The patient apparently ran out of his simvastatin and has restarted the medication.  He admits to not watching his diet closely.  We had a discussion about appropriate diet and weight reduction.  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  Left Circumflex Coronary Artery and ramus intermedius had noncritical disease.    Echocardiogram in October 2019 showed:  · The study is technically difficult for diagnosis.  · The left ventricular cavity is borderline dilated.  · Left ventricular wall thickness is consistent with mild-to-moderate concentric hypertrophy.  · Estimated EF = 56%.  · Left ventricular systolic function is normal.  · Left ventricular diastolic dysfunction (grade I) consistent with impaired relaxation.  · Right ventricular cavity is moderately dilated. Systolic function normal  · Moderate dilation of the aortic root is present (4.5 cms)     Clinical exam today showed no evidence of bronchospasm or congestive heart failure.  His blood pressure was in the normal range.  O2 saturation was normal.    EKG today showed sinus rhythm with heart rate of 66 bpm.  Left axis deviation.  Incomplete RBBB.   Nonspecific T wave changes.  Baseline artifacts.  PAC.    SUBJECTIVE    Allergies   Allergen Reactions   • Atorvastatin Other (See Comments)     Leg cramps/muscle cramps         Past Medical History:   Diagnosis Date   • Coronary atherosclerosis of native coronary artery    • Diabetes mellitus (CMS/HCC)    • Hyperlipidemia    • Hypertension    • Obesity    • Old myocardial infarction          Past Surgical History:   Procedure Laterality Date   • CARDIAC SURGERY      pt states he had one stent put in         Family History   Problem Relation Age of Onset   • Depression Mother    • Diabetes Mother    • Drug abuse Mother    • Heart disease Mother    • Hyperlipidemia Mother    • Alcohol abuse Father    • Cancer Father    • COPD Father    • Hypertension Father    • Early death Brother          Social History     Socioeconomic History   • Marital status:      Spouse name: Not on file   • Number of children: Not on file   • Years of education: Not on file   • Highest education level: Not on file   Tobacco Use   • Smoking status: Former Smoker     Packs/day: 2.00     Years: 45.00     Pack years: 90.00     Quit date: 2011     Years since quittin.4   • Smokeless tobacco: Never Used   Substance and Sexual Activity   • Alcohol use: No     Frequency: Never   • Drug use: No   • Sexual activity: Defer         Current Outpatient Medications   Medication Sig Dispense Refill   • aspirin 325 MG tablet Take 325 mg by mouth Daily.     • bumetanide (BUMEX) 2 MG tablet Take 0.5 tablets by mouth Daily. 45 tablet 3   • carvedilol (COREG) 6.25 MG tablet Take 1 tablet by mouth 2 (Two) Times a Day. 180 tablet 3   • clopidogrel (PLAVIX) 75 MG tablet Take 1 tablet by mouth Daily. 90 tablet 3   • dapagliflozin-metformin HCl ER (XIGDUO XR) 5-1000 MG tablet Take 2 tablets by mouth Daily With Breakfast. 60 tablet 11   • diphenhydrAMINE (BENADRYL) 25 mg capsule Take 25 mg by mouth At Night As Needed for Itching.     • glimepiride  "(AMARYL) 1 MG tablet   1   • isosorbide mononitrate (IMDUR) 30 MG 24 hr tablet Take 1 tablet by mouth Daily. 90 tablet 3   • losartan (COZAAR) 100 MG tablet Take 1 tablet by mouth Daily. 90 tablet 3   • metFORMIN (GLUCOPHAGE) 1000 MG tablet   1   • spironolactone (ALDACTONE) 25 MG tablet Take 1 tablet by mouth Daily. 90 tablet 3   • vitamin D (ERGOCALCIFEROL) 1.25 MG (04236 UT) capsule capsule Take 50,000 Units by mouth.     • rosuvastatin (CRESTOR) 20 MG tablet Take 1 tablet by mouth Daily. 90 tablet 3     No current facility-administered medications for this visit.          OBJECTIVE    /76 (BP Location: Left arm, Patient Position: Sitting, Cuff Size: Adult)   Pulse 69   Ht 188 cm (74\")   Wt (!) 161 kg (355 lb 12.8 oz)   SpO2 98%   BMI 45.68 kg/m²         Review of Systems     Constitutional:  Denies recent weight loss, weight gain, fever or chills     HENT:  Denies any hearing loss, epistaxis, hoarseness, or difficulty speaking.     Eyes: Wears eyeglasses or contact lenses     Respiratory: Dyspnea with exertion,no cough, wheezing, or hemoptysis.     Cardiovascular: Negative for palpations, chest pain, orthopnea, PND.      Gastrointestinal:  Denies change in bowel habits, dyspepsia, ulcer disease, hematochezia, or melena.     Endocrine: Negative for cold intolerance, heat intolerance, polydipsia, polyphagia and polyuria.    Genitourinary: Negative.      Musculoskeletal: Denies any history of arthritic symptoms or back problems     Skin:  Denies any change in hair or nails, rashes, or skin lesions.     Allergic/Immunologic: Negative.  Negative for environmental allergies, food allergies and immunocompromised state.     Neurological:  Denies any history of recurrent headaches, strokes, TIA, or seizure disorder.     Hematological: Denies any food allergies, seasonal allergies, bleeding disorders, or lymphadenopathy.     Psychiatric/Behavioral: Denies any history of depression, substance abuse, or change in " cognitive function.         Physical Exam     Constitutional: Cooperative, alert and oriented,  in no acute distress. Morbidly obese    HENT:   Head: Normocephalic, normal hair patterns, no masses or tenderness.  Ears, Nose, and Throat: No gross abnormalities. No pallor or cyanosis.   Eyes: EOMS intact, PERRL, conjunctivae and lids unremarkable. Fundoscopic exam and visual fields not performed.   Neck: No palpable masses or adenopathy, no thyromegaly, no JVD, carotid pulses are full and equal bilaterally and without  Bruits.     Cardiovascular: Regular rhythm, S1 and S2 normal, no S3 or S4.No murmurs, gallops, or rubs detected.     Pulmonary/Chest: Chest: normal symmetry,  normal respiratory excursion, no intercostal retraction, no use of accessory muscles.            Pulmonary: Normal breath sounds. No rales or ronchi.    Abdominal: Abdomen soft, bowel sounds normoactive, no masses, no hepatosplenomegaly, non-tender, no bruits.     Musculoskeletal: No deformities, clubbing, cyanosis, erythema. 1+ pitting edema    Neurological: No gross motor or sensory deficits noted, affect appropriate, oriented to time, person, place.     Skin: Warm and dry to the touch, no apparent skin lesions or masses noted.     Psychiatric: He has a normal mood and affect. His behavior is normal. Judgment and thought content normal.         Procedures      Lab Results   Component Value Date    WBC 8.88 05/16/2017    HGB 12.7 (L) 05/16/2017    HCT 36.3 (L) 05/16/2017    MCV 90.1 05/16/2017     (L) 05/16/2017     Lab Results   Component Value Date    GLUCOSE 185 (H) 05/16/2017    BUN 22 (H) 05/16/2017    CREATININE 0.98 05/16/2017    EGFRIFNONA 77 05/16/2017    BCR 22.4 05/16/2017    CO2 23.0 05/16/2017    CALCIUM 9.8 05/16/2017    ALBUMIN 3.40 05/16/2017    AST 78 (H) 05/16/2017    ALT 61 05/16/2017     No results found for: CHOL  No results found for: TRIG  No results found for: HDL  No components found for: LDLCALC  No results found  for: LDL  No results found for: HDLLDLRATIO  No components found for: CHOLHDL  Lab Results   Component Value Date    HGBA1C 6.2 08/13/2020     No results found for: TSH, K7XJYFS, C2XIDQY, THYROIDAB        ASSESSMENT AND PLAN  Mr. Alvarez has multiple medical issues as discussed under history of present illness.  His dyspnea is most likely multifactorial and his LV systolic function is known to be normal by echocardiogram.  He most likely has sleep apnea but does not wish to get this checked. He was recommended compression stockings but he believes that he will not be able to wear it regularly and will not use it.  I have continued bumetanide 1 mg daily along with Aldactone.  Antiplatelet therapy with aspirin and Plavix and management of hypertension with Coreg, losartan and antianginal therapy with isosorbide mononitrate has been continued.  I have discontinued simvastatin and started him on Crestor 20 mg daily for optimization of lipid profile.  Aggressive risk factor modification and weight reduction was recommended.     Diagnoses and all orders for this visit:    1. Hypertension associated with diabetes (CMS/[facility]) (Primary)  -     ECG 12 Lead    2. Atherosclerosis of native coronary artery of native heart without angina pectoris    3. Old myocardial infarction    4. Class 3 severe obesity due to excess calories with serious comorbidity and body mass index (BMI) of 45.0 to 49.9 in adult (CMS/[facility])    Other orders  -     rosuvastatin (CRESTOR) 20 MG tablet; Take 1 tablet by mouth Daily.  Dispense: 90 tablet; Refill: 3        Patient's Body mass index is 45.68 kg/m². BMI is above normal parameters. Recommendations include: exercise counseling and nutrition counseling.  Patient is a non-smoker    Robinson Fierro MD  11/25/2020  13:11 CST

## 2020-11-30 LAB
QT INTERVAL: 396 MS
QTC INTERVAL: 415 MS

## 2021-01-26 RX ORDER — CARVEDILOL 6.25 MG/1
TABLET ORAL
Qty: 180 TABLET | Refills: 0 | Status: SHIPPED | OUTPATIENT
Start: 2021-01-26 | End: 2021-07-27

## 2021-02-23 ENCOUNTER — IMMUNIZATION (OUTPATIENT)
Dept: VACCINE CLINIC | Facility: HOSPITAL | Age: 66
End: 2021-02-23

## 2021-02-23 PROCEDURE — 0001A: CPT | Performed by: THORACIC SURGERY (CARDIOTHORACIC VASCULAR SURGERY)

## 2021-02-23 PROCEDURE — 91300 HC SARSCOV02 VAC 30MCG/0.3ML IM: CPT | Performed by: THORACIC SURGERY (CARDIOTHORACIC VASCULAR SURGERY)

## 2021-03-16 ENCOUNTER — IMMUNIZATION (OUTPATIENT)
Dept: VACCINE CLINIC | Facility: HOSPITAL | Age: 66
End: 2021-03-16

## 2021-03-16 PROCEDURE — 91300 HC SARSCOV02 VAC 30MCG/0.3ML IM: CPT | Performed by: THORACIC SURGERY (CARDIOTHORACIC VASCULAR SURGERY)

## 2021-03-16 PROCEDURE — 0002A: CPT | Performed by: THORACIC SURGERY (CARDIOTHORACIC VASCULAR SURGERY)

## 2021-03-17 ENCOUNTER — APPOINTMENT (OUTPATIENT)
Dept: VACCINE CLINIC | Facility: HOSPITAL | Age: 66
End: 2021-03-17

## 2021-04-26 RX ORDER — CLOPIDOGREL BISULFATE 75 MG/1
TABLET ORAL
Qty: 90 TABLET | Refills: 3 | Status: SHIPPED | OUTPATIENT
Start: 2021-04-26 | End: 2022-06-27 | Stop reason: SDUPTHER

## 2021-05-16 RX ORDER — LOSARTAN POTASSIUM 100 MG/1
TABLET ORAL
Qty: 90 TABLET | Refills: 0 | Status: SHIPPED | OUTPATIENT
Start: 2021-05-16 | End: 2021-05-17

## 2021-05-17 ENCOUNTER — TELEPHONE (OUTPATIENT)
Dept: CARDIOLOGY | Facility: CLINIC | Age: 66
End: 2021-05-17

## 2021-05-17 RX ORDER — LOSARTAN POTASSIUM 100 MG/1
100 TABLET ORAL DAILY
Qty: 90 TABLET | Refills: 3 | Status: SHIPPED | OUTPATIENT
Start: 2021-05-17 | End: 2022-05-13

## 2021-05-17 RX ORDER — SPIRONOLACTONE 25 MG/1
TABLET ORAL
Qty: 90 TABLET | Refills: 3 | Status: SHIPPED | OUTPATIENT
Start: 2021-05-17 | End: 2022-06-27 | Stop reason: SDUPTHER

## 2021-05-17 RX ORDER — LOSARTAN POTASSIUM 100 MG/1
TABLET ORAL
Qty: 90 TABLET | Refills: 3 | Status: SHIPPED | OUTPATIENT
Start: 2021-05-17 | End: 2021-05-17 | Stop reason: SDUPTHER

## 2021-05-17 NOTE — TELEPHONE ENCOUNTER
Kim called upset that his pharmacy had sent in a request last week for his Losartin and he went in Saturday to  and it was not there. I did let him know that Dr Fierro was not here last week to sign, but it has been signed now and he will contact the pharmacy today

## 2021-05-21 ENCOUNTER — TELEPHONE (OUTPATIENT)
Dept: CARDIOLOGY | Facility: CLINIC | Age: 66
End: 2021-05-21

## 2021-05-21 NOTE — TELEPHONE ENCOUNTER
Called pt and he told me he already received his losartan that was sent to [facility] in [place]

## 2021-05-28 ENCOUNTER — OFFICE VISIT (OUTPATIENT)
Dept: CARDIOLOGY | Facility: CLINIC | Age: 66
End: 2021-05-28

## 2021-05-28 VITALS
BODY MASS INDEX: 40.43 KG/M2 | HEART RATE: 71 BPM | DIASTOLIC BLOOD PRESSURE: 72 MMHG | TEMPERATURE: 96.9 F | HEIGHT: 74 IN | OXYGEN SATURATION: 98 % | WEIGHT: 315 LBS | SYSTOLIC BLOOD PRESSURE: 138 MMHG

## 2021-05-28 DIAGNOSIS — I25.10 ATHEROSCLEROSIS OF NATIVE CORONARY ARTERY OF NATIVE HEART WITHOUT ANGINA PECTORIS: ICD-10-CM

## 2021-05-28 DIAGNOSIS — E11.59 HYPERTENSION ASSOCIATED WITH DIABETES (HCC): Primary | ICD-10-CM

## 2021-05-28 DIAGNOSIS — E66.01 CLASS 3 SEVERE OBESITY DUE TO EXCESS CALORIES WITH SERIOUS COMORBIDITY AND BODY MASS INDEX (BMI) OF 45.0 TO 49.9 IN ADULT (HCC): ICD-10-CM

## 2021-05-28 DIAGNOSIS — I15.2 HYPERTENSION ASSOCIATED WITH DIABETES (HCC): Primary | ICD-10-CM

## 2021-05-28 PROCEDURE — 99213 OFFICE O/P EST LOW 20 MIN: CPT | Performed by: INTERNAL MEDICINE

## 2021-05-28 NOTE — PROGRESS NOTES
Aramis Alvarez  65 y.o. male      1. Hypertension associated with diabetes (CMS/[facility])    2. Class 3 severe obesity due to excess calories with serious comorbidity and body mass index (BMI) of 45.0 to 49.9 in adult (CMS/[facility])    3. Atherosclerosis of native coronary artery of native heart without angina pectoris        History of Present Illness  Mr. Alvarez is here for follow-up of his above-stated problems.  He has multiple medical issues including history of coronary artery disease, morbid obesity, hypertension, hyperlipidemia, diabetes mellitus.    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  Left Circumflex Coronary Artery and ramus intermedius had noncritical disease.    Echocardiogram in October 2019 showed normal LV systolic function with an EF of 56% with mild to moderate LVH.  There was moderate enlargement of the right ventricle and moderate aortic root dilatation of 4.5 cm.  No significant valve abnormalities noted.    From a symptom standpoint, the patient has done reasonably well and able to perform his activities of daily living.  He is unfortunately not able to watch his diet and does not wish to do so.  He has been compliant with his medications.  Heart rate and blood pressure were within normal limits.  No signs of congestive heart failure was noted.    SUBJECTIVE    Allergies   Allergen Reactions   • Atorvastatin Other (See Comments)     Leg cramps/muscle cramps         Past Medical History:   Diagnosis Date   • Coronary atherosclerosis of native coronary artery    • Diabetes mellitus (CMS/HCC)    • Hyperlipidemia    • Hypertension    • Obesity    • Old myocardial infarction          Past Surgical History:   Procedure Laterality Date   • CARDIAC SURGERY      pt states he had one stent put in         Family History   Problem Relation Age of Onset   •  Depression Mother    • Diabetes Mother    • Drug abuse Mother    • Heart disease Mother    • Hyperlipidemia Mother    • Alcohol abuse Father    • Cancer Father    • COPD Father    • Hypertension Father    • Early death Brother          Social History     Socioeconomic History   • Marital status:      Spouse name: Not on file   • Number of children: Not on file   • Years of education: Not on file   • Highest education level: Not on file   Tobacco Use   • Smoking status: Former Smoker     Packs/day: 2.00     Years: 45.00     Pack years: 90.00     Quit date: 2011     Years since quittin.9   • Smokeless tobacco: Never Used   Substance and Sexual Activity   • Alcohol use: No   • Drug use: No   • Sexual activity: Defer         Current Outpatient Medications   Medication Sig Dispense Refill   • aspirin 325 MG tablet Take 325 mg by mouth Daily.     • bumetanide (BUMEX) 2 MG tablet Take 0.5 tablets by mouth Daily. 45 tablet 3   • carvedilol (COREG) 6.25 MG tablet Take 1 tablet by mouth once daily 180 tablet 0   • clopidogrel (PLAVIX) 75 MG tablet Take 1 tablet by mouth once daily 90 tablet 3   • dapagliflozin-metformin HCl ER (XIGDUO XR) 5-1000 MG tablet Take 2 tablets by mouth Daily With Breakfast. 60 tablet 11   • diphenhydrAMINE (BENADRYL) 25 mg capsule Take 25 mg by mouth At Night As Needed for Itching.     • glimepiride (AMARYL) 1 MG tablet   1   • isosorbide mononitrate (IMDUR) 30 MG 24 hr tablet Take 1 tablet by mouth Daily. 90 tablet 3   • losartan (COZAAR) 100 MG tablet Take 1 tablet by mouth Daily. 90 tablet 3   • metFORMIN (GLUCOPHAGE) 1000 MG tablet   1   • rosuvastatin (CRESTOR) 20 MG tablet Take 1 tablet by mouth Daily. 90 tablet 3   • spironolactone (ALDACTONE) 25 MG tablet Take 1 tablet by mouth once daily 90 tablet 3   • vitamin D (ERGOCALCIFEROL) 1.25 MG (94890 UT) capsule capsule Take 50,000 Units by mouth.       No current facility-administered medications for this visit.  "        OBJECTIVE    /72 (BP Location: Left arm, Patient Position: Sitting, Cuff Size: Adult)   Pulse 71   Temp 96.9 °F (36.1 °C)   Ht 188 cm (74\")   Wt (!) 161 kg (355 lb)   SpO2 98%   BMI 45.58 kg/m²         Review of Systems : The following systems were reviewed and no changes noted    Constitutional:  Denies recent weight loss, weight gain, fever or chills     HENT:  Denies any hearing loss, epistaxis, hoarseness, or difficulty speaking.     Eyes: Wears eyeglasses or contact lenses     Respiratory: Dyspnea with exertion,no cough, wheezing, or hemoptysis.     Cardiovascular: Negative for palpations, chest pain, orthopnea, PND.      Gastrointestinal:  Denies change in bowel habits, dyspepsia, ulcer disease, hematochezia, or melena.     Endocrine: Negative for cold intolerance, heat intolerance, polydipsia, polyphagia and polyuria.    Genitourinary: Negative.      Musculoskeletal: Denies any history of arthritic symptoms or back problems     Neurological:  Denies any history of recurrent headaches, strokes, TIA, or seizure disorder.     Hematological: Denies any food allergies, seasonal allergies, bleeding disorders, or lymphadenopathy.     Psychiatric/Behavioral: Denies any history of depression, substance abuse, or change in cognitive function.       Physical Exam : The following systems were reviewed and no changes noted    Constitutional: Cooperative, alert and oriented,  in no acute distress. Morbidly obese    HENT:   Head: Normocephalic, normal hair patterns, no masses or tenderness.  Ears, Nose, and Throat: No gross abnormalities. No pallor or cyanosis.   Eyes: EOMS intact, PERRL, conjunctivae and lids unremarkable. Fundoscopic exam and visual fields not performed.   Neck: No palpable masses or adenopathy, no thyromegaly, no JVD, carotid pulses are full and equal bilaterally and without  Bruits.     Cardiovascular: Regular rhythm, S1 and S2 normal, no S3 or S4.No murmurs, gallops, or rubs " detected.     Pulmonary/Chest: Chest: normal symmetry,  normal respiratory excursion, no intercostal retraction, no use of accessory muscles.            Pulmonary: Normal breath sounds. No rales or ronchi.    Abdominal: Abdomen soft, bowel sounds normoactive, no masses, no hepatosplenomegaly, non-tender, no bruits.     Musculoskeletal: No deformities, clubbing, cyanosis, erythema. 1+ pitting edema    Neurological: No gross motor or sensory deficits noted, affect appropriate, oriented to time, person, place.     Skin: Warm and dry to the touch, no apparent skin lesions or masses noted.     Psychiatric: He has a normal mood and affect. His behavior is normal. Judgment and thought content normal.         Procedures      Lab Results   Component Value Date    WBC 8.88 05/16/2017    HGB 12.7 (L) 05/16/2017    HCT 36.3 (L) 05/16/2017    MCV 90.1 05/16/2017     (L) 05/16/2017     Lab Results   Component Value Date    GLUCOSE 185 (H) 05/16/2017    BUN 22 (H) 05/16/2017    CREATININE 0.98 05/16/2017    EGFRIFNONA 77 05/16/2017    BCR 22.4 05/16/2017    CO2 23.0 05/16/2017    CALCIUM 9.8 05/16/2017    ALBUMIN 3.40 05/16/2017    AST 78 (H) 05/16/2017    ALT 61 05/16/2017     No results found for: CHOL  No results found for: TRIG  No results found for: HDL  No components found for: LDLCALC  No results found for: LDL  No results found for: HDLLDLRATIO  No components found for: CHOLHDL  Lab Results   Component Value Date    HGBA1C 6.2 08/13/2020     No results found for: TSH, S4JJYKX, I2GKRRG, THYROIDAB        ASSESSMENT AND PLAN  Mr. Alvarez has multiple medical issues as discussed under history of present illness.  He is doing reasonably well from a symptom standpoint and is satisfied with medical therapy.  He denied any chest pain but has chronic dyspnea on exertion.  He most likely has sleep apnea but does not wish to get this checked.  He is a management challenge secondary to morbid obesity and the fact that he does not  watch his diet.  I have continued bumetanide 1 mg daily along with Aldactone.  Antiplatelet therapy with aspirin and Plavix and management of hypertension with Coreg, losartan and antianginal therapy with isosorbide mononitrate has been continued.  Lipid-lowering therapy with Crestor has been continued.    Diagnoses and all orders for this visit:    1. Hypertension associated with diabetes (CMS/[facility]) (Primary)    2. Class 3 severe obesity due to excess calories with serious comorbidity and body mass index (BMI) of 45.0 to 49.9 in adult (CMS/[facility])    3. Atherosclerosis of native coronary artery of native heart without angina pectoris        Patient's Body mass index is 45.58 kg/m². BMI is above normal parameters. Recommendations include: exercise counseling and nutrition counseling.  Patient is a non-smoker    Robinson Fierro MD  5/28/2021  16:26 CDT

## 2021-06-21 RX ORDER — ISOSORBIDE MONONITRATE 30 MG/1
TABLET, EXTENDED RELEASE ORAL
Qty: 90 TABLET | Refills: 3 | Status: SHIPPED | OUTPATIENT
Start: 2021-06-21 | End: 2022-06-27 | Stop reason: SDUPTHER

## 2021-06-21 RX ORDER — BUMETANIDE 2 MG/1
TABLET ORAL
Qty: 45 TABLET | Refills: 3 | Status: SHIPPED | OUTPATIENT
Start: 2021-06-21 | End: 2022-06-27 | Stop reason: SDUPTHER

## 2021-07-27 RX ORDER — CARVEDILOL 6.25 MG/1
TABLET ORAL
Qty: 90 TABLET | Refills: 3 | Status: SHIPPED | OUTPATIENT
Start: 2021-07-27 | End: 2022-06-27 | Stop reason: SDUPTHER

## 2021-11-18 ENCOUNTER — HOSPITAL ENCOUNTER (OUTPATIENT)
Dept: ULTRASOUND IMAGING | Facility: HOSPITAL | Age: 66
Discharge: HOME | End: 2021-11-18
Admitting: NURSE PRACTITIONER

## 2021-11-18 DIAGNOSIS — M79.662 PAIN OF LEFT CALF: ICD-10-CM

## 2021-11-18 PROCEDURE — 93971 EXTREMITY STUDY: CPT

## 2021-11-23 ENCOUNTER — HOSPITAL ENCOUNTER (OUTPATIENT)
Dept: ULTRASOUND IMAGING | Facility: HOSPITAL | Age: 66
Discharge: HOME | End: 2021-11-23
Admitting: NURSE PRACTITIONER

## 2021-11-23 DIAGNOSIS — R80.1 PERSISTENT PROTEINURIA: ICD-10-CM

## 2021-11-23 DIAGNOSIS — N17.9 ACUTE KIDNEY INJURY (HCC): ICD-10-CM

## 2021-11-23 DIAGNOSIS — N18.2 CHRONIC KIDNEY DISEASE, STAGE II (MILD): ICD-10-CM

## 2021-11-23 PROCEDURE — 76775 US EXAM ABDO BACK WALL LIM: CPT

## 2021-12-07 ENCOUNTER — OFFICE VISIT (OUTPATIENT)
Dept: CARDIOLOGY | Facility: CLINIC | Age: 66
End: 2021-12-07

## 2021-12-07 ENCOUNTER — TRANSCRIBE ORDERS (OUTPATIENT)
Dept: LAB | Facility: HOSPITAL | Age: 66
End: 2021-12-07

## 2021-12-07 ENCOUNTER — LAB (OUTPATIENT)
Dept: LAB | Facility: HOSPITAL | Age: 66
End: 2021-12-07

## 2021-12-07 VITALS
HEART RATE: 75 BPM | BODY MASS INDEX: 40.43 KG/M2 | SYSTOLIC BLOOD PRESSURE: 140 MMHG | TEMPERATURE: 97.1 F | HEIGHT: 74 IN | OXYGEN SATURATION: 98 % | DIASTOLIC BLOOD PRESSURE: 70 MMHG | WEIGHT: 315 LBS

## 2021-12-07 DIAGNOSIS — I10 HYPERTENSION, ESSENTIAL: ICD-10-CM

## 2021-12-07 DIAGNOSIS — R80.9 PROTEINURIA, UNSPECIFIED TYPE: ICD-10-CM

## 2021-12-07 DIAGNOSIS — E78.2 MIXED HYPERLIPIDEMIA: ICD-10-CM

## 2021-12-07 DIAGNOSIS — E55.9 VITAMIN D DEFICIENCY DISEASE: ICD-10-CM

## 2021-12-07 DIAGNOSIS — N17.9 ACUTE RENAL FAILURE, UNSPECIFIED ACUTE RENAL FAILURE TYPE (HCC): ICD-10-CM

## 2021-12-07 DIAGNOSIS — I15.2 HYPERTENSION ASSOCIATED WITH DIABETES (HCC): ICD-10-CM

## 2021-12-07 DIAGNOSIS — N18.2 CHRONIC KIDNEY DISEASE, STAGE II (MILD): ICD-10-CM

## 2021-12-07 DIAGNOSIS — I25.10 DISEASE OF CARDIOVASCULAR SYSTEM: ICD-10-CM

## 2021-12-07 DIAGNOSIS — E11.9 DIABETES MELLITUS WITHOUT COMPLICATION (HCC): ICD-10-CM

## 2021-12-07 DIAGNOSIS — E83.42 HYPOMAGNESEMIA: ICD-10-CM

## 2021-12-07 DIAGNOSIS — E11.59 HYPERTENSION ASSOCIATED WITH DIABETES (HCC): ICD-10-CM

## 2021-12-07 DIAGNOSIS — N17.9 ACUTE RENAL FAILURE, UNSPECIFIED ACUTE RENAL FAILURE TYPE (HCC): Primary | ICD-10-CM

## 2021-12-07 DIAGNOSIS — I25.10 ATHEROSCLEROSIS OF NATIVE CORONARY ARTERY OF NATIVE HEART WITHOUT ANGINA PECTORIS: ICD-10-CM

## 2021-12-07 DIAGNOSIS — R06.09 DYSPNEA ON EXERTION: Primary | ICD-10-CM

## 2021-12-07 LAB
25(OH)D3 SERPL-MCNC: 27.6 NG/ML
ALBUMIN SERPL-MCNC: 4 G/DL (ref 3.5–5.2)
ANION GAP SERPL CALCULATED.3IONS-SCNC: 10.8 MMOL/L (ref 5–15)
BACTERIA UR QL AUTO: ABNORMAL /HPF
BASOPHILS # BLD AUTO: 0.09 10*3/MM3 (ref 0–0.2)
BASOPHILS NFR BLD AUTO: 1.2 % (ref 0–1.5)
BILIRUB UR QL STRIP: NEGATIVE
BUN SERPL-MCNC: 17 MG/DL (ref 8–23)
BUN/CREAT SERPL: 15.2 (ref 7–25)
CALCIUM SPEC-SCNC: 10.7 MG/DL (ref 8.6–10.5)
CHLORIDE SERPL-SCNC: 103 MMOL/L (ref 98–107)
CLARITY UR: CLEAR
CO2 SERPL-SCNC: 24.2 MMOL/L (ref 22–29)
COLOR UR: YELLOW
CREAT SERPL-MCNC: 1.12 MG/DL (ref 0.76–1.27)
CREAT UR-MCNC: 32.1 MG/DL
CREAT UR-MCNC: 32.4 MG/DL
DEPRECATED RDW RBC AUTO: 43 FL (ref 37–54)
EOSINOPHIL # BLD AUTO: 0.28 10*3/MM3 (ref 0–0.4)
EOSINOPHIL NFR BLD AUTO: 3.8 % (ref 0.3–6.2)
ERYTHROCYTE [DISTWIDTH] IN BLOOD BY AUTOMATED COUNT: 13.6 % (ref 12.3–15.4)
GFR SERPL CREATININE-BSD FRML MDRD: 65 ML/MIN/1.73
GLUCOSE SERPL-MCNC: 124 MG/DL (ref 65–99)
GLUCOSE UR STRIP-MCNC: NEGATIVE MG/DL
HCT VFR BLD AUTO: 42.6 % (ref 37.5–51)
HGB BLD-MCNC: 14.1 G/DL (ref 13–17.7)
HGB UR QL STRIP.AUTO: NEGATIVE
HYALINE CASTS UR QL AUTO: ABNORMAL /LPF
IMM GRANULOCYTES # BLD AUTO: 0.02 10*3/MM3 (ref 0–0.05)
IMM GRANULOCYTES NFR BLD AUTO: 0.3 % (ref 0–0.5)
KETONES UR QL STRIP: NEGATIVE
LEUKOCYTE ESTERASE UR QL STRIP.AUTO: ABNORMAL
LYMPHOCYTES # BLD AUTO: 2.01 10*3/MM3 (ref 0.7–3.1)
LYMPHOCYTES NFR BLD AUTO: 27.1 % (ref 19.6–45.3)
MAGNESIUM SERPL-MCNC: 2.3 MG/DL (ref 1.6–2.4)
MCH RBC QN AUTO: 29.1 PG (ref 26.6–33)
MCHC RBC AUTO-ENTMCNC: 33.1 G/DL (ref 31.5–35.7)
MCV RBC AUTO: 87.8 FL (ref 79–97)
MONOCYTES # BLD AUTO: 0.56 10*3/MM3 (ref 0.1–0.9)
MONOCYTES NFR BLD AUTO: 7.5 % (ref 5–12)
NEUTROPHILS NFR BLD AUTO: 4.47 10*3/MM3 (ref 1.7–7)
NEUTROPHILS NFR BLD AUTO: 60.1 % (ref 42.7–76)
NITRITE UR QL STRIP: NEGATIVE
NRBC BLD AUTO-RTO: 0 /100 WBC (ref 0–0.2)
PH UR STRIP.AUTO: 6 [PH] (ref 5–8)
PHOSPHATE SERPL-MCNC: 2.9 MG/DL (ref 2.5–4.5)
PLATELET # BLD AUTO: 302 10*3/MM3 (ref 140–450)
PMV BLD AUTO: 9.4 FL (ref 6–12)
POTASSIUM SERPL-SCNC: 5.3 MMOL/L (ref 3.5–5.2)
PROT ?TM UR-MCNC: 39 MG/DL
PROT UR QL STRIP: ABNORMAL
PROT/CREAT UR: 1203.7 MG/G CREA (ref 0–200)
PTH-INTACT SERPL-MCNC: 93.3 PG/ML (ref 15–65)
RBC # BLD AUTO: 4.85 10*6/MM3 (ref 4.14–5.8)
RBC # UR STRIP: ABNORMAL /HPF
REF LAB TEST METHOD: ABNORMAL
SODIUM SERPL-SCNC: 138 MMOL/L (ref 136–145)
SODIUM UR-SCNC: 123 MMOL/L
SP GR UR STRIP: 1.01 (ref 1–1.03)
SQUAMOUS #/AREA URNS HPF: ABNORMAL /HPF
URATE SERPL-MCNC: 8 MG/DL (ref 3.4–7)
UROBILINOGEN UR QL STRIP: ABNORMAL
WBC # UR STRIP: ABNORMAL /HPF
WBC NRBC COR # BLD: 7.43 10*3/MM3 (ref 3.4–10.8)

## 2021-12-07 PROCEDURE — 81001 URINALYSIS AUTO W/SCOPE: CPT

## 2021-12-07 PROCEDURE — 82306 VITAMIN D 25 HYDROXY: CPT

## 2021-12-07 PROCEDURE — 99213 OFFICE O/P EST LOW 20 MIN: CPT | Performed by: INTERNAL MEDICINE

## 2021-12-07 PROCEDURE — 83735 ASSAY OF MAGNESIUM: CPT

## 2021-12-07 PROCEDURE — 85025 COMPLETE CBC W/AUTO DIFF WBC: CPT

## 2021-12-07 PROCEDURE — 83970 ASSAY OF PARATHORMONE: CPT

## 2021-12-07 PROCEDURE — 36415 COLL VENOUS BLD VENIPUNCTURE: CPT

## 2021-12-07 PROCEDURE — 80069 RENAL FUNCTION PANEL: CPT

## 2021-12-07 PROCEDURE — 84156 ASSAY OF PROTEIN URINE: CPT

## 2021-12-07 PROCEDURE — 82570 ASSAY OF URINE CREATININE: CPT

## 2021-12-07 PROCEDURE — 84300 ASSAY OF URINE SODIUM: CPT

## 2021-12-07 PROCEDURE — 84550 ASSAY OF BLOOD/URIC ACID: CPT

## 2021-12-07 RX ORDER — ERGOCALCIFEROL 1.25 MG/1
50000 CAPSULE ORAL
COMMUNITY
Start: 2021-09-22 | End: 2022-09-23

## 2021-12-07 RX ORDER — MAGNESIUM OXIDE 400 MG/1
400 TABLET ORAL DAILY
COMMUNITY
Start: 2021-11-11 | End: 2022-06-27 | Stop reason: SDUPTHER

## 2021-12-07 NOTE — PROGRESS NOTES
Aramis Alvarez  66 y.o. male      1. Dyspnea on exertion    2. Hypertension associated with diabetes (HCC)    3. Atherosclerosis of native coronary artery of native heart without angina pectoris        History of Present Illness  Mr. Alvarez is here for follow-up of his above-stated problems. He has multiple medical issues including history of coronary artery disease, morbid obesity, hypertension, hyperlipidemia, diabetes mellitus and CKD.    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  Left Circumflex Coronary Artery and ramus intermedius had noncritical disease.    Echocardiogram in October 2019 showed normal LV systolic function with an EF of 56% with mild to moderate LVH.  There was moderate enlargement of the right ventricle and moderate aortic root dilatation of 4.5 cm.  No significant valve abnormalities noted.    Patient did not voice any complaints today.  He denied any chest pain or shortness of breath and did have Covid about 3 months prior in spite of taking both doses of vaccine and booster.  He did lose about 20 pounds in weight.  He claims compliance with medication.  He does follow-up with nephrology on a regular basis.    Allergies   Allergen Reactions   • Atorvastatin Other (See Comments)     Leg cramps/muscle cramps         Past Medical History:   Diagnosis Date   • Coronary atherosclerosis of native coronary artery    • Diabetes mellitus (HCC)    • Hyperlipidemia    • Hypertension    • Obesity    • Old myocardial infarction          Past Surgical History:   Procedure Laterality Date   • CARDIAC SURGERY      pt states he had one stent put in         Family History   Problem Relation Age of Onset   • Depression Mother    • Diabetes Mother    • Drug abuse Mother    • Heart disease Mother    • Hyperlipidemia Mother    • Alcohol abuse Father    •  "Cancer Father    • COPD Father    • Hypertension Father    • Early death Brother          Social History     Socioeconomic History   • Marital status:    Tobacco Use   • Smoking status: Former Smoker     Packs/day: 2.00     Years: 45.00     Pack years: 90.00     Quit date: 6/9/2011     Years since quitting: 10.5   • Smokeless tobacco: Never Used   Substance and Sexual Activity   • Alcohol use: No   • Drug use: No   • Sexual activity: Defer         Current Outpatient Medications   Medication Sig Dispense Refill   • aspirin 325 MG tablet Take 325 mg by mouth Daily.     • bumetanide (BUMEX) 2 MG tablet Take 1/2 (one-half) tablet by mouth once daily 45 tablet 3   • carvedilol (COREG) 6.25 MG tablet Take 1 tablet by mouth once daily 90 tablet 3   • clopidogrel (PLAVIX) 75 MG tablet Take 1 tablet by mouth once daily 90 tablet 3   • ergocalciferol (ERGOCALCIFEROL) 1.25 MG (81141 UT) capsule Take 50,000 Units by mouth.     • glimepiride (AMARYL) 1 MG tablet   1   • isosorbide mononitrate (IMDUR) 30 MG 24 hr tablet Take 1 tablet by mouth once daily 90 tablet 3   • losartan (COZAAR) 100 MG tablet Take 1 tablet by mouth Daily. 90 tablet 3   • magnesium oxide (MAG-OX) 400 MG tablet Take 400 mg by mouth Daily.     • metFORMIN (GLUCOPHAGE) 1000 MG tablet   1   • rosuvastatin (CRESTOR) 20 MG tablet Take 1 tablet by mouth Daily. 90 tablet 3   • spironolactone (ALDACTONE) 25 MG tablet Take 1 tablet by mouth once daily 90 tablet 3   • dapagliflozin-metformin HCl ER (XIGDUO XR) 5-1000 MG tablet Take 2 tablets by mouth Daily With Breakfast. 60 tablet 11   • diphenhydrAMINE (BENADRYL) 25 mg capsule Take 25 mg by mouth At Night As Needed for Itching.       No current facility-administered medications for this visit.         OBJECTIVE    /70 (BP Location: Left arm, Patient Position: Sitting, Cuff Size: Adult)   Pulse 75   Temp 97.1 °F (36.2 °C)   Ht 188 cm (74\")   Wt (!) 152 kg (335 lb)   SpO2 98%   BMI 43.01 kg/m² "         Review of Systems : The following systems were reviewed and no changes noted    Constitutional:  Denies recent weight loss, weight gain, fever or chills     HENT:  Denies any hearing loss, epistaxis, hoarseness, or difficulty speaking.     Eyes: Wears eyeglasses or contact lenses     Respiratory: Dyspnea with exertion,no cough, wheezing, or hemoptysis.     Cardiovascular: Negative for palpations, chest pain, orthopnea, PND.      Gastrointestinal:  Denies change in bowel habits, dyspepsia, ulcer disease, hematochezia, or melena.     Endocrine: Negative for cold intolerance, heat intolerance, polydipsia, polyphagia and polyuria.    Genitourinary: CKD      Musculoskeletal: Denies any history of arthritic symptoms or back problems     Neurological:  Denies any history of recurrent headaches, strokes, TIA, or seizure disorder.     Hematological: Denies any food allergies, seasonal allergies, bleeding disorders, or lymphadenopathy.     Psychiatric/Behavioral: Denies any history of depression, substance abuse, or change in cognitive function.       Physical Exam : The following systems were reviewed and no changes noted    Constitutional: Cooperative, alert and oriented,  in no acute distress. Morbidly obese    HENT:   Head: Normocephalic, normal hair patterns, no masses or tenderness.  Ears, Nose, and Throat: No gross abnormalities. No pallor or cyanosis.   Eyes: EOMS intact, PERRL, conjunctivae and lids unremarkable. Fundoscopic exam and visual fields not performed.   Neck: No palpable masses or adenopathy, no thyromegaly, no JVD, carotid pulses are full and equal bilaterally and without  Bruits.     Cardiovascular: Regular rhythm, S1 and S2 normal, no S3 or S4.No murmurs, gallops, or rubs detected.     Pulmonary/Chest: Chest: normal symmetry,  normal respiratory excursion, no intercostal retraction, no use of accessory muscles.            Pulmonary: Normal breath sounds. No rales or ronchi.    Abdominal: Abdomen  soft, bowel sounds normoactive, no masses, no hepatosplenomegaly, non-tender, no bruits.     Musculoskeletal: No deformities, clubbing, cyanosis, erythema. 1+ pitting edema    Neurological: No gross motor or sensory deficits noted, affect appropriate, oriented to time, person, place.     Skin: Warm and dry to the touch, no apparent skin lesions or masses noted.     Psychiatric: He has a normal mood and affect. His behavior is normal. Judgment and thought content normal.         Procedures      Lab Results   Component Value Date    WBC 5.9 11/03/2021    HGB 14.3 11/03/2021    HCT 46.4 11/03/2021    MCV 91.5 11/03/2021     11/03/2021     Lab Results   Component Value Date    GLUCOSE 185 (H) 05/16/2017    BUN 22 (H) 05/16/2017    CREATININE 0.98 05/16/2017    EGFRIFNONA 77 05/16/2017    BCR 22.4 05/16/2017    CO2 23.0 05/16/2017    CALCIUM 9.8 05/16/2017    ALBUMIN 3.40 05/16/2017    AST 78 (H) 05/16/2017    ALT 61 05/16/2017     No results found for: CHOL  No results found for: TRIG  No results found for: HDL  No components found for: LDLCALC  No results found for: LDL  No results found for: HDLLDLRATIO  No components found for: CHOLHDL  Lab Results   Component Value Date    HGBA1C 6.1 09/20/2021     No results found for: TSH, B8RPPJE, M4RCVHF, THYROIDAB        ASSESSMENT AND PLAN  Mr. Alvarez has multiple medical issues as discussed under history of present illness.  He is progressing well and denied any cardiac symptoms at the present time.  He does have CKD with a GFR of 41 and creatinine of 1.7 and is being followed by nephrology on a regular basis.  His lipid profiles were in the normal range and LDL was 37 and HDL 32 in September 2021.    He most likely has sleep apnea but does not wish to get this checked.  He is a management challenge secondary to morbid obesity and the fact that he does not watch his diet.  I have continued bumetanide 1 mg daily along with Aldactone.  Antiplatelet therapy with aspirin  and Plavix and management of hypertension with Coreg, losartan and antianginal therapy with isosorbide mononitrate has been continued.  Lipid-lowering therapy with Crestor has been continued.    Diagnoses and all orders for this visit:    1. Dyspnea on exertion (Primary)    2. Hypertension associated with diabetes (HCC)  -     Cancel: ECG 12 Lead    3. Atherosclerosis of native coronary artery of native heart without angina pectoris        Patient's Body mass index is 43.01 kg/m². BMI is above normal parameters. Recommendations include: exercise counseling and nutrition counseling.  Patient is a non-smoker    Robinson Fierro MD  12/7/2021  11:08 CST

## 2022-03-07 ENCOUNTER — TRANSCRIBE ORDERS (OUTPATIENT)
Dept: LAB | Facility: HOSPITAL | Age: 67
End: 2022-03-07

## 2022-03-07 DIAGNOSIS — I10 HYPERTENSION, ESSENTIAL: ICD-10-CM

## 2022-03-07 DIAGNOSIS — N18.2 CHRONIC KIDNEY DISEASE, STAGE II (MILD): ICD-10-CM

## 2022-03-07 DIAGNOSIS — R80.9 PROTEINURIA, UNSPECIFIED TYPE: ICD-10-CM

## 2022-03-07 DIAGNOSIS — E11.9 DIABETES MELLITUS WITHOUT COMPLICATION: ICD-10-CM

## 2022-03-07 DIAGNOSIS — E78.2 MIXED HYPERLIPIDEMIA: ICD-10-CM

## 2022-03-07 DIAGNOSIS — N17.9 ACUTE RENAL FAILURE, UNSPECIFIED ACUTE RENAL FAILURE TYPE: Primary | ICD-10-CM

## 2022-03-07 DIAGNOSIS — E55.9 VITAMIN D DEFICIENCY DISEASE: ICD-10-CM

## 2022-05-13 RX ORDER — LOSARTAN POTASSIUM 100 MG/1
TABLET ORAL
Qty: 90 TABLET | Refills: 3 | Status: SHIPPED | OUTPATIENT
Start: 2022-05-13 | End: 2022-06-27 | Stop reason: SDUPTHER

## 2022-06-27 ENCOUNTER — OFFICE VISIT (OUTPATIENT)
Dept: CARDIOLOGY | Facility: CLINIC | Age: 67
End: 2022-06-27

## 2022-06-27 VITALS
WEIGHT: 315 LBS | BODY MASS INDEX: 40.43 KG/M2 | SYSTOLIC BLOOD PRESSURE: 130 MMHG | HEIGHT: 74 IN | OXYGEN SATURATION: 99 % | TEMPERATURE: 97.1 F | DIASTOLIC BLOOD PRESSURE: 66 MMHG | HEART RATE: 70 BPM

## 2022-06-27 DIAGNOSIS — E78.2 MIXED DIABETIC HYPERLIPIDEMIA ASSOCIATED WITH TYPE 2 DIABETES MELLITUS: ICD-10-CM

## 2022-06-27 DIAGNOSIS — E11.69 MIXED DIABETIC HYPERLIPIDEMIA ASSOCIATED WITH TYPE 2 DIABETES MELLITUS: ICD-10-CM

## 2022-06-27 DIAGNOSIS — I15.2 HYPERTENSION ASSOCIATED WITH DIABETES: ICD-10-CM

## 2022-06-27 DIAGNOSIS — I25.2 OLD MYOCARDIAL INFARCTION: ICD-10-CM

## 2022-06-27 DIAGNOSIS — E11.65 TYPE 2 DIABETES MELLITUS WITH HYPERGLYCEMIA, WITHOUT LONG-TERM CURRENT USE OF INSULIN: ICD-10-CM

## 2022-06-27 DIAGNOSIS — E11.59 HYPERTENSION ASSOCIATED WITH DIABETES: ICD-10-CM

## 2022-06-27 DIAGNOSIS — I25.10 ATHEROSCLEROSIS OF NATIVE CORONARY ARTERY OF NATIVE HEART WITHOUT ANGINA PECTORIS: Primary | ICD-10-CM

## 2022-06-27 PROCEDURE — 99214 OFFICE O/P EST MOD 30 MIN: CPT | Performed by: INTERNAL MEDICINE

## 2022-06-27 RX ORDER — MAGNESIUM OXIDE 400 MG/1
400 TABLET ORAL DAILY
Qty: 90 TABLET | Refills: 3 | Status: SHIPPED | OUTPATIENT
Start: 2022-06-27 | End: 2022-11-14 | Stop reason: SDUPTHER

## 2022-06-27 RX ORDER — CARVEDILOL 6.25 MG/1
6.25 TABLET ORAL 2 TIMES DAILY WITH MEALS
Qty: 180 TABLET | Refills: 3 | Status: SHIPPED | OUTPATIENT
Start: 2022-06-27 | End: 2022-07-14

## 2022-06-27 RX ORDER — CLOPIDOGREL BISULFATE 75 MG/1
75 TABLET ORAL DAILY
Qty: 90 TABLET | Refills: 3 | Status: SHIPPED | OUTPATIENT
Start: 2022-06-27 | End: 2022-11-14 | Stop reason: SDUPTHER

## 2022-06-27 RX ORDER — BUMETANIDE 2 MG/1
1 TABLET ORAL DAILY
Qty: 45 TABLET | Refills: 3 | Status: SHIPPED | OUTPATIENT
Start: 2022-06-27 | End: 2022-11-14 | Stop reason: SDUPTHER

## 2022-06-27 RX ORDER — ROSUVASTATIN CALCIUM 20 MG/1
20 TABLET, COATED ORAL DAILY
Qty: 90 TABLET | Refills: 3 | Status: SHIPPED | OUTPATIENT
Start: 2022-06-27 | End: 2022-11-14 | Stop reason: SDUPTHER

## 2022-06-27 RX ORDER — ISOSORBIDE MONONITRATE 30 MG/1
30 TABLET, EXTENDED RELEASE ORAL DAILY
Qty: 90 TABLET | Refills: 3 | Status: SHIPPED | OUTPATIENT
Start: 2022-06-27 | End: 2022-11-14 | Stop reason: SDUPTHER

## 2022-06-27 RX ORDER — SPIRONOLACTONE 25 MG/1
25 TABLET ORAL DAILY
Qty: 90 TABLET | Refills: 3 | Status: SHIPPED | OUTPATIENT
Start: 2022-06-27 | End: 2022-11-14 | Stop reason: SDUPTHER

## 2022-06-27 RX ORDER — LOSARTAN POTASSIUM 100 MG/1
100 TABLET ORAL DAILY
Qty: 90 TABLET | Refills: 3 | Status: SHIPPED | OUTPATIENT
Start: 2022-06-27 | End: 2022-11-14 | Stop reason: SDUPTHER

## 2022-06-27 NOTE — PROGRESS NOTES
Aramis Alvarez  66 y.o. male      1. Atherosclerosis of native coronary artery of native heart without angina pectoris    2. Hypertension associated with diabetes ([facility])    3. Mixed diabetic hyperlipidemia associated with type 2 diabetes mellitus (HCC)    4. Old myocardial infarction    5. Type 2 diabetes mellitus with hyperglycemia, without long-term current use of insulin ([facility])        History of Present Illness  Mr. Alvarez is here for follow-up of his above-stated problems. He has multiple medical issues including history of coronary artery disease, morbid obesity, hypertension, hyperlipidemia, diabetes mellitus and CKD.    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  Left Circumflex Coronary Artery and ramus intermedius had noncritical disease.    Echocardiogram in October 2019 showed normal LV systolic function with an EF of 56% with mild to moderate LVH.  There was moderate enlargement of the right ventricle and moderate aortic root dilatation of 4.5 cm.  No significant valve abnormalities noted.    The patient has progressed well and denied any cardiac symptoms with no chest pain, shortness of breath or palpitation.  Compliant with his medications.  His heart rate and blood pressure were normal range.    Allergies   Allergen Reactions   • Atorvastatin Other (See Comments)     Leg cramps/muscle cramps         Past Medical History:   Diagnosis Date   • Coronary atherosclerosis of native coronary artery    • Diabetes mellitus (HCC)    • Hyperlipidemia    • Hypertension    • Obesity    • Old myocardial infarction          Past Surgical History:   Procedure Laterality Date   • CARDIAC SURGERY      pt states he had one stent put in         Family History   Problem Relation Age of Onset   • Depression Mother    • Diabetes Mother    • Drug abuse Mother    • Heart  "disease Mother    • Hyperlipidemia Mother    • Alcohol abuse Father    • Cancer Father    • COPD Father    • Hypertension Father    • Early death Brother          Social History     Socioeconomic History   • Marital status:    Tobacco Use   • Smoking status: Former Smoker     Packs/day: 2.00     Years: 45.00     Pack years: 90.00     Quit date: 2011     Years since quittin.0   • Smokeless tobacco: Never Used   Substance and Sexual Activity   • Alcohol use: No   • Drug use: No   • Sexual activity: Defer         Current Outpatient Medications   Medication Sig Dispense Refill   • aspirin 325 MG tablet Take 325 mg by mouth Daily.     • bumetanide (BUMEX) 2 MG tablet Take 0.5 tablets by mouth Daily. 45 tablet 3   • carvedilol (COREG) 6.25 MG tablet Take 1 tablet by mouth 2 (Two) Times a Day With Meals. 180 tablet 3   • clopidogrel (PLAVIX) 75 MG tablet Take 1 tablet by mouth Daily. 90 tablet 3   • dapagliflozin-metformin HCl ER (XIGDUO XR) 5-1000 MG tablet Take 2 tablets by mouth Daily With Breakfast. 60 tablet 11   • ergocalciferol (ERGOCALCIFEROL) 1.25 MG (87912 UT) capsule Take 50,000 Units by mouth.     • glimepiride (AMARYL) 1 MG tablet   1   • isosorbide mononitrate (IMDUR) 30 MG 24 hr tablet Take 1 tablet by mouth Daily. 90 tablet 3   • losartan (COZAAR) 100 MG tablet Take 1 tablet by mouth Daily. 90 tablet 3   • magnesium oxide (MAG-OX) 400 MG tablet Take 1 tablet by mouth Daily. 90 tablet 3   • metFORMIN (GLUCOPHAGE) 1000 MG tablet   1   • rosuvastatin (CRESTOR) 20 MG tablet Take 1 tablet by mouth Daily. 90 tablet 3   • spironolactone (ALDACTONE) 25 MG tablet Take 1 tablet by mouth Daily. 90 tablet 3     No current facility-administered medications for this visit.         OBJECTIVE    /66 (BP Location: Left arm, Patient Position: Sitting, Cuff Size: Adult)   Pulse 70   Temp 97.1 °F (36.2 °C)   Ht 188 cm (74\")   Wt (!) 152 kg (335 lb)   SpO2 99%   BMI 43.01 kg/m²         Review of " Systems : The following systems were reviewed and no changes noted    Constitutional:  Denies recent weight loss, weight gain, fever or chills     HENT:  Denies any hearing loss, epistaxis, hoarseness, or difficulty speaking.     Eyes: Wears eyeglasses or contact lenses     Respiratory: Dyspnea with exertion,no cough, wheezing, or hemoptysis.     Cardiovascular: Negative for palpations, chest pain, orthopnea, PND.      Gastrointestinal:  Denies change in bowel habits, dyspepsia, ulcer disease, hematochezia, or melena.     Endocrine: Negative for cold intolerance, heat intolerance, polydipsia, polyphagia and polyuria.    Genitourinary: CKD      Musculoskeletal: Denies any history of arthritic symptoms or back problems     Neurological:  Denies any history of recurrent headaches, strokes, TIA, or seizure disorder.     Hematological: Denies any food allergies, seasonal allergies, bleeding disorders, or lymphadenopathy.     Psychiatric/Behavioral: Denies any history of depression, substance abuse, or change in cognitive function.       Physical Exam : The following systems were reviewed and no changes noted    Constitutional: Cooperative, alert and oriented,  in no acute distress. Morbidly obese    HENT:   Head: Normocephalic, normal hair patterns, no masses or tenderness.  Ears, Nose, and Throat: No gross abnormalities. No pallor or cyanosis.   Eyes: EOMS intact, PERRL, conjunctivae and lids unremarkable. Fundoscopic exam and visual fields not performed.   Neck: No palpable masses or adenopathy, no thyromegaly, no JVD, carotid pulses are full and equal bilaterally and without  Bruits.     Cardiovascular: Regular rhythm, S1 and S2 normal, no S3 or S4.No murmurs, gallops, or rubs detected.     Pulmonary/Chest: Chest: normal symmetry,  normal respiratory excursion, no intercostal retraction, no use of accessory muscles.            Pulmonary: Normal breath sounds. No rales or ronchi.    Abdominal: Abdomen soft, bowel  sounds normoactive, no masses, no hepatosplenomegaly, non-tender, no bruits.     Musculoskeletal: No deformities, clubbing, cyanosis, erythema. 1+ pitting edema    Neurological: No gross motor or sensory deficits noted, affect appropriate, oriented to time, person, place.     Skin: Warm and dry to the touch, no apparent skin lesions or masses noted.     Psychiatric: He has a normal mood and affect. His behavior is normal. Judgment and thought content normal.         Procedures      Lab Results   Component Value Date    WBC 7.43 12/07/2021    HGB 14.1 12/07/2021    HCT 42.6 12/07/2021    MCV 87.8 12/07/2021     12/07/2021     Lab Results   Component Value Date    GLUCOSE 182 (H) 03/08/2022    BUN 19 03/08/2022    BUN 19 03/08/2022    CREATININE 1.3 03/08/2022    CREATININE 1.3 03/08/2022    EGFRIFNONA 65 12/07/2021    BCR 15.2 12/07/2021    CO2 26 03/08/2022    CO2 26 03/08/2022    CALCIUM 10.4 (H) 03/08/2022    CALCIUM 10.4 (H) 03/08/2022    ALBUMIN 4.1 03/08/2022    ALBUMIN 4.1 03/08/2022    AST 78 (H) 05/16/2017    ALT 61 05/16/2017     No results found for: CHOL  Lab Results   Component Value Date    TRIG 191 09/20/2021     Lab Results   Component Value Date    HDL 32 09/20/2021     No components found for: LDLCALC  Lab Results   Component Value Date    LDL 37 09/20/2021     No results found for: HDLLDLRATIO  No components found for: CHOLHDL  Lab Results   Component Value Date    HGBA1C 6.1 09/20/2021     No results found for: TSH, G9RWUGU, U6ZHFRG, THYROIDAB        ASSESSMENT AND PLAN  Mr. Alvarez has multiple medical issues as discussed under history of present illness.  He is progressing well and denied any cardiac symptoms at the present time.    He is a management challenge secondary to morbid obesity and the fact that he does not watch his diet.  I have continued bumetanide 1 mg daily along with Aldactone.  Antiplatelet therapy with aspirin and Plavix and management of hypertension with Coreg, losartan  and antianginal therapy with isosorbide mononitrate has been continued.  Lipid-lowering therapy with Crestor has been continued.  His LDL was 77 when checked in September 2021.  He does follow-up with nephrology on a regular basis.  His cardiac medications were reviewed and refilled today.    Diagnoses and all orders for this visit:    1. Atherosclerosis of native coronary artery of native heart without angina pectoris (Primary)    2. Hypertension associated with diabetes (HCC)    3. Mixed diabetic hyperlipidemia associated with type 2 diabetes mellitus (HCC)    4. Old myocardial infarction    5. Type 2 diabetes mellitus with hyperglycemia, without long-term current use of insulin ([facility])    Other orders  -     bumetanide (BUMEX) 2 MG tablet; Take 0.5 tablets by mouth Daily.  Dispense: 45 tablet; Refill: 3  -     clopidogrel (PLAVIX) 75 MG tablet; Take 1 tablet by mouth Daily.  Dispense: 90 tablet; Refill: 3  -     carvedilol (COREG) 6.25 MG tablet; Take 1 tablet by mouth 2 (Two) Times a Day With Meals.  Dispense: 180 tablet; Refill: 3  -     isosorbide mononitrate (IMDUR) 30 MG 24 hr tablet; Take 1 tablet by mouth Daily.  Dispense: 90 tablet; Refill: 3  -     losartan (COZAAR) 100 MG tablet; Take 1 tablet by mouth Daily.  Dispense: 90 tablet; Refill: 3  -     magnesium oxide (MAG-OX) 400 MG tablet; Take 1 tablet by mouth Daily.  Dispense: 90 tablet; Refill: 3  -     rosuvastatin (CRESTOR) 20 MG tablet; Take 1 tablet by mouth Daily.  Dispense: 90 tablet; Refill: 3  -     spironolactone (ALDACTONE) 25 MG tablet; Take 1 tablet by mouth Daily.  Dispense: 90 tablet; Refill: 3        Patient's Body mass index is 43.01 kg/m². BMI is above normal parameters. Recommendations include: exercise counseling and nutrition counseling.  Patient is a non-smoker    Robinson Fierro MD  6/27/2022  09:43 CDT

## 2022-07-14 RX ORDER — CARVEDILOL 6.25 MG/1
TABLET ORAL
Qty: 90 TABLET | Refills: 0 | Status: SHIPPED | OUTPATIENT
Start: 2022-07-14 | End: 2022-11-14 | Stop reason: SDUPTHER

## 2022-11-14 ENCOUNTER — TELEPHONE (OUTPATIENT)
Dept: CARDIOLOGY | Facility: CLINIC | Age: 67
End: 2022-11-14

## 2022-11-14 RX ORDER — BUMETANIDE 2 MG/1
1 TABLET ORAL DAILY
Qty: 45 TABLET | Refills: 3 | Status: SHIPPED | OUTPATIENT
Start: 2022-11-14

## 2022-11-14 RX ORDER — ROSUVASTATIN CALCIUM 20 MG/1
20 TABLET, COATED ORAL DAILY
Qty: 90 TABLET | Refills: 3 | Status: SHIPPED | OUTPATIENT
Start: 2022-11-14

## 2022-11-14 RX ORDER — MAGNESIUM OXIDE 400 MG/1
400 TABLET ORAL DAILY
Qty: 90 TABLET | Refills: 3 | Status: SHIPPED | OUTPATIENT
Start: 2022-11-14

## 2022-11-14 RX ORDER — LOSARTAN POTASSIUM 100 MG/1
100 TABLET ORAL DAILY
Qty: 90 TABLET | Refills: 3 | Status: SHIPPED | OUTPATIENT
Start: 2022-11-14

## 2022-11-14 RX ORDER — CARVEDILOL 6.25 MG/1
6.25 TABLET ORAL DAILY
Qty: 90 TABLET | Refills: 3 | Status: SHIPPED | OUTPATIENT
Start: 2022-11-14

## 2022-11-14 RX ORDER — ISOSORBIDE MONONITRATE 30 MG/1
30 TABLET, EXTENDED RELEASE ORAL DAILY
Qty: 90 TABLET | Refills: 3 | Status: SHIPPED | OUTPATIENT
Start: 2022-11-14

## 2022-11-14 RX ORDER — SPIRONOLACTONE 25 MG/1
25 TABLET ORAL DAILY
Qty: 90 TABLET | Refills: 3 | Status: SHIPPED | OUTPATIENT
Start: 2022-11-14

## 2022-11-14 RX ORDER — CLOPIDOGREL BISULFATE 75 MG/1
75 TABLET ORAL DAILY
Qty: 90 TABLET | Refills: 3 | Status: SHIPPED | OUTPATIENT
Start: 2022-11-14

## 2022-11-14 NOTE — TELEPHONE ENCOUNTER
Refills sent to [facility] in [place]----- Message from Shu Lemus sent at 2022  9:57 AM CST -----  Regarding: refill  Contact: [phone]  Aramis Alvarez kristin [birth year] stated he also need his Carvediol 6.25 mg, Isosorbide mononitrate 30 mg, Magnesium Oxide 400 mg, Rosuvastatin 20 mg , Spironolactone 25 mg, Bumex 2 mg and Clopidogrel 75 mg refill @ [facility] [place]

## 2023-01-16 ENCOUNTER — OFFICE VISIT (OUTPATIENT)
Dept: CARDIOLOGY | Facility: CLINIC | Age: 68
End: 2023-01-16
Payer: MEDICARE

## 2023-01-16 VITALS
TEMPERATURE: 97.1 F | WEIGHT: 315 LBS | BODY MASS INDEX: 40.43 KG/M2 | HEART RATE: 81 BPM | HEIGHT: 74 IN | DIASTOLIC BLOOD PRESSURE: 70 MMHG | SYSTOLIC BLOOD PRESSURE: 126 MMHG | OXYGEN SATURATION: 97 %

## 2023-01-16 DIAGNOSIS — E11.69 MIXED DIABETIC HYPERLIPIDEMIA ASSOCIATED WITH TYPE 2 DIABETES MELLITUS: ICD-10-CM

## 2023-01-16 DIAGNOSIS — I25.10 ATHEROSCLEROSIS OF NATIVE CORONARY ARTERY OF NATIVE HEART WITHOUT ANGINA PECTORIS: Primary | ICD-10-CM

## 2023-01-16 DIAGNOSIS — E78.2 MIXED DIABETIC HYPERLIPIDEMIA ASSOCIATED WITH TYPE 2 DIABETES MELLITUS: ICD-10-CM

## 2023-01-16 DIAGNOSIS — I15.2 HYPERTENSION ASSOCIATED WITH DIABETES: ICD-10-CM

## 2023-01-16 DIAGNOSIS — E11.59 HYPERTENSION ASSOCIATED WITH DIABETES: ICD-10-CM

## 2023-01-16 PROCEDURE — 99214 OFFICE O/P EST MOD 30 MIN: CPT | Performed by: INTERNAL MEDICINE

## 2023-01-16 RX ORDER — ERGOCALCIFEROL 1.25 MG/1
50000 CAPSULE ORAL WEEKLY
COMMUNITY
Start: 2023-01-08

## 2023-01-16 RX ORDER — GLIPIZIDE 10 MG/1
10 TABLET ORAL
COMMUNITY
End: 2023-01-16

## 2023-01-16 RX ORDER — CHLORAL HYDRATE 500 MG
1000 CAPSULE ORAL DAILY
COMMUNITY
Start: 2022-10-06 | End: 2023-04-05

## 2023-01-16 NOTE — PROGRESS NOTES
Aramis Alvarez  67 y.o. male      1. Atherosclerosis of native coronary artery of native heart without angina pectoris    2. Hypertension associated with diabetes (HCC)    3. Mixed diabetic hyperlipidemia associated with type 2 diabetes mellitus (HCC)        History of Present Illness  Mr. Alvarez is here for follow-up of his above-stated problems. He has multiple medical issues including history of coronary artery disease, morbid obesity, hypertension, hyperlipidemia, diabetes mellitus and CKD.    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  Left Circumflex Coronary Artery and ramus intermedius had noncritical disease.    Echocardiogram in October 2019 showed normal LV systolic function with an EF of 56% with mild to moderate LVH.  There was moderate enlargement of the right ventricle and moderate aortic root dilatation of 4.5 cm.  No significant valve abnormalities noted.    He denied any cardiac symptoms and has been compliant with his medication.  His heart rate and blood pressure were in the normal range.  No signs of CHF was noted.    Allergies   Allergen Reactions   • Atorvastatin Other (See Comments)     Leg cramps/muscle cramps         Past Medical History:   Diagnosis Date   • Coronary atherosclerosis of native coronary artery    • Diabetes mellitus (HCC)    • Hyperlipidemia    • Hypertension    • Obesity    • Old myocardial infarction          Past Surgical History:   Procedure Laterality Date   • CARDIAC SURGERY      pt states he had one stent put in         Family History   Problem Relation Age of Onset   • Depression Mother    • Diabetes Mother    • Drug abuse Mother    • Heart disease Mother    • Hyperlipidemia Mother    • Alcohol abuse Father    • Cancer Father    • COPD Father    • Hypertension Father    • Early death Brother          Social  "History     Socioeconomic History   • Marital status:    Tobacco Use   • Smoking status: Former     Packs/day: 2.00     Years: 45.00     Pack years: 90.00     Types: Cigarettes     Quit date: 2011     Years since quittin.6   • Smokeless tobacco: Never   Substance and Sexual Activity   • Alcohol use: No   • Drug use: No   • Sexual activity: Defer         Current Outpatient Medications   Medication Sig Dispense Refill   • aspirin 325 MG tablet Take 325 mg by mouth Daily.     • bumetanide (BUMEX) 2 MG tablet Take 0.5 tablets by mouth Daily. 45 tablet 3   • carvedilol (COREG) 6.25 MG tablet Take 1 tablet by mouth Daily. 90 tablet 3   • clopidogrel (PLAVIX) 75 MG tablet Take 1 tablet by mouth Daily. 90 tablet 3   • dapagliflozin-metformin HCl ER (XIGDUO XR) 5-1000 MG tablet Take 2 tablets by mouth Daily With Breakfast. 60 tablet 11   • glimepiride (AMARYL) 1 MG tablet   1   • isosorbide mononitrate (IMDUR) 30 MG 24 hr tablet Take 1 tablet by mouth Daily. 90 tablet 3   • losartan (COZAAR) 100 MG tablet Take 1 tablet by mouth Daily. 90 tablet 3   • magnesium oxide (MAG-OX) 400 MG tablet Take 1 tablet by mouth Daily. 90 tablet 3   • metFORMIN (GLUCOPHAGE) 1000 MG tablet   1   • Omega-3 Fatty Acids (fish oil) 1000 MG capsule capsule Take 1,000 mg by mouth Daily.     • rosuvastatin (CRESTOR) 20 MG tablet Take 1 tablet by mouth Daily. 90 tablet 3   • spironolactone (ALDACTONE) 25 MG tablet Take 1 tablet by mouth Daily. 90 tablet 3   • vitamin D (ERGOCALCIFEROL) 1.25 MG (94819 UT) capsule capsule Take 50,000 Units by mouth 1 (One) Time Per Week.       No current facility-administered medications for this visit.         OBJECTIVE    /70 (BP Location: Left arm, Patient Position: Sitting, Cuff Size: Adult)   Pulse 81   Temp 97.1 °F (36.2 °C)   Ht 188 cm (74\")   Wt (!) 149 kg (329 lb)   SpO2 97%   BMI 42.24 kg/m²         Review of Systems : The following systems were reviewed and no changes " noted    Constitutional:  Denies recent weight loss, weight gain, fever or chills     HENT:  Denies any hearing loss, epistaxis, hoarseness, or difficulty speaking.     Eyes: Wears eyeglasses or contact lenses     Respiratory: no cough, wheezing, or hemoptysis.     Cardiovascular: Negative for palpations, chest pain, orthopnea, PND.      Gastrointestinal:  Denies change in bowel habits, dyspepsia, ulcer disease, hematochezia, or melena.     Endocrine: Negative for cold intolerance, heat intolerance, polydipsia, polyphagia and polyuria.    Genitourinary: CKD      Musculoskeletal: Denies any history of arthritic symptoms or back problems     Neurological:  Denies any history of recurrent headaches, strokes, TIA, or seizure disorder.     Hematological: Denies any food allergies, seasonal allergies, bleeding disorders, or lymphadenopathy.     Psychiatric/Behavioral: Denies any history of depression, substance abuse, or change in cognitive function.       Physical Exam : The following systems were reviewed and no changes noted    Constitutional: Cooperative, alert and oriented,  in no acute distress. Morbidly obese.  BMI 42.2    HENT:   Head: Normocephalic, normal hair patterns, no masses or tenderness.  Ears, Nose, and Throat: No gross abnormalities. No pallor or cyanosis.   Eyes: EOMS intact, PERRL, conjunctivae and lids unremarkable. Fundoscopic exam and visual fields not performed.   Neck: No palpable masses or adenopathy, no thyromegaly, no JVD, carotid pulses are full and equal bilaterally and without  Bruits.     Cardiovascular: Regular rhythm, S1 and S2 normal, no S3 or S4.No murmurs, gallops, or rubs detected.     Pulmonary/Chest: Chest: normal symmetry,  normal respiratory excursion, no intercostal retraction, no use of accessory muscles.            Pulmonary: Normal breath sounds. No rales or ronchi.    Abdominal: Abdomen soft, bowel sounds normoactive, no masses, no hepatosplenomegaly, non-tender, no bruits.      Musculoskeletal: No deformities, clubbing, cyanosis, erythema. 1+ pitting edema    Neurological: No gross motor or sensory deficits noted, affect appropriate, oriented to time, person, place.     Skin: Warm and dry to the touch, no apparent skin lesions or masses noted.     Psychiatric: He has a normal mood and affect. His behavior is normal. Judgment and thought content normal.         Procedures      Lab Results   Component Value Date    WBC 6.2 10/05/2022    HGB 14.7 10/05/2022    HCT 47.2 10/05/2022    MCV 93.3 10/05/2022     10/05/2022     Lab Results   Component Value Date    GLUCOSE 182 (H) 03/08/2022    BUN 21 10/27/2022    CREATININE 1.3 10/27/2022    EGFRIFNONA 65 12/07/2021    BCR 15.2 12/07/2021    CO2 25 10/27/2022    CALCIUM 10.3 10/27/2022    ALBUMIN 3.9 10/27/2022    AST 78 (H) 05/16/2017    ALT 61 05/16/2017     No results found for: CHOL  Lab Results   Component Value Date    TRIG 301 (H) 10/05/2022    TRIG 191 09/20/2021     Lab Results   Component Value Date    HDL 34 (L) 10/05/2022    HDL 32 09/20/2021     No components found for: LDLCALC  Lab Results   Component Value Date    LDL 40 10/05/2022    LDL 37 09/20/2021     No results found for: HDLLDLRATIO  No components found for: CHOLHDL  Lab Results   Component Value Date    HGBA1C 6.5 09/06/2022     No results found for: TSH, O1AYWDK, Z0EGZQR, THYROIDAB        ASSESSMENT AND PLAN  Mr. Alvarez has multiple medical issues as discussed under history of present illness.  No clinical findings to suggest angina, arrhythmia or congestive heart failure noted.    He is a management challenge secondary to morbid obesity and the fact that he does not watch his diet.    I have continued bumetanide 1 mg daily along with Aldactone.  Antiplatelet therapy with aspirin and Plavix and management of hypertension with Coreg, losartan and antianginal therapy with isosorbide mononitrate has been continued.  Lipid-lowering therapy with Crestor has been  continued.      He had lab work done in October 2022 and LDL was 40 and HDL 34.  Triglycerides were 301.  CBC was within normal limits.  Electrolytes, BUN, creatinine were within normal limits.  Diagnoses and all orders for this visit:    1. Atherosclerosis of native coronary artery of native heart without angina pectoris (Primary)    2. Hypertension associated with diabetes (HCC)    3. Mixed diabetic hyperlipidemia associated with type 2 diabetes mellitus (HCC)        Patient's Body mass index is 42.24 kg/m². BMI is above normal parameters. Recommendations include: exercise counseling and nutrition counseling.  Patient is a non-smoker    Robinson Fierro MD  1/16/2023  09:55 CST

## 2023-05-03 ENCOUNTER — HOSPITAL ENCOUNTER (EMERGENCY)
Facility: HOSPITAL | Age: 68
Discharge: HOME | End: 2023-05-03
Attending: STUDENT IN AN ORGANIZED HEALTH CARE EDUCATION/TRAINING PROGRAM
Payer: COMMERCIAL

## 2023-05-03 VITALS
TEMPERATURE: 97.9 F | SYSTOLIC BLOOD PRESSURE: 213 MMHG | BODY MASS INDEX: 44.1 KG/M2 | HEIGHT: 71 IN | OXYGEN SATURATION: 96 % | WEIGHT: 315 LBS | HEART RATE: 91 BPM | DIASTOLIC BLOOD PRESSURE: 80 MMHG | RESPIRATION RATE: 20 BRPM

## 2023-05-03 DIAGNOSIS — Z79.01 ANTICOAGULATED: ICD-10-CM

## 2023-05-03 DIAGNOSIS — R04.0 EPISTAXIS: Primary | ICD-10-CM
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
## 2023-05-03 PROCEDURE — 99283 EMERGENCY DEPT VISIT LOW MDM: CPT

## 2023-05-03 RX ORDER — TRANEXAMIC ACID 100 MG/ML
500 INJECTION, SOLUTION INTRAVENOUS ONCE
Status: COMPLETED | OUTPATIENT
Start: 2023-05-03 | End: 2023-05-03

## 2023-05-03 RX ORDER — AMOXICILLIN AND CLAVULANATE POTASSIUM 875; 125 MG/1; MG/1
1 TABLET, FILM COATED ORAL 2 TIMES DAILY
Qty: 14 TABLET | Refills: 0 | Status: SHIPPED | OUTPATIENT
Start: 2023-05-03 | End: 2023-05-10

## 2023-05-03 RX ORDER — AMOXICILLIN AND CLAVULANATE POTASSIUM 875; 125 MG/1; MG/1
1 TABLET, FILM COATED ORAL ONCE
Status: COMPLETED | OUTPATIENT
Start: 2023-05-03 | End: 2023-05-03

## 2023-05-03 RX ADMIN — PHENYLEPHRINE HYDROCHLORIDE 2 SPRAY: 0.5 SPRAY NASAL at 13:52

## 2023-05-03 RX ADMIN — AMOXICILLIN AND CLAVULANATE POTASSIUM 1 TABLET: 875; 125 TABLET, FILM COATED ORAL at 17:00

## 2023-05-03 RX ADMIN — TRANEXAMIC ACID 500 MG: 100 INJECTION, SOLUTION INTRAVENOUS at 15:17

## 2023-05-03 NOTE — ED PROVIDER NOTES
Subjective   History of Present Illness  67 year old male patient presents to ER for complaint of bleeding from left nare since 1030 this AM. He reports that he tripped over his dogs chain, falling from a standing position face first in his grass yard. He denies LOC. Has been using packing but has not had resolution of bleeding. He takes plavix and ASA daily. He denies pain to facial bones. He denies tobacco use, drinks a beer occasionally, and denies illicit drug use.        Review of Systems   Constitutional: Negative.  Negative for fever.   HENT: Positive for nosebleeds. Negative for facial swelling.    Eyes: Negative.    Respiratory: Negative.    Cardiovascular: Negative.    Gastrointestinal: Negative.  Negative for nausea and vomiting.   Endocrine: Negative.    Genitourinary: Negative.    Musculoskeletal: Negative.    Skin: Negative.    Allergic/Immunologic: Negative.    Neurological: Negative.  Negative for syncope and headaches.   Hematological: Negative.    Psychiatric/Behavioral: Negative.        Past Medical History:   Diagnosis Date   • Coronary atherosclerosis of native coronary artery    • Diabetes mellitus    • Hyperlipidemia    • Hypertension    • Obesity    • Old myocardial infarction        Allergies   Allergen Reactions   • Atorvastatin Other (See Comments)     Leg cramps/muscle cramps       Past Surgical History:   Procedure Laterality Date   • CARDIAC SURGERY      pt states he had one stent put in       Family History   Problem Relation Age of Onset   • Depression Mother    • Diabetes Mother    • Drug abuse Mother    • Heart disease Mother    • Hyperlipidemia Mother    • Alcohol abuse Father    • Cancer Father    • COPD Father    • Hypertension Father    • Early death Brother        Social History     Socioeconomic History   • Marital status:    Tobacco Use   • Smoking status: Former     Packs/day: 2.00     Years: 45.00     Pack years: 90.00     Types: Cigarettes     Quit date: 6/9/2011  "    Years since quittin.9   • Smokeless tobacco: Never   Substance and Sexual Activity   • Alcohol use: No   • Drug use: No   • Sexual activity: Defer           Objective    BP (!) 213/80 (BP Location: Right arm, Patient Position: Sitting)   Pulse 91   Temp 97.9 °F (36.6 °C) (Oral)   Resp 20   Ht 180.3 cm (71\")   Wt (!) 146 kg (321 lb)   SpO2 96%   BMI 44.77 kg/m²     Physical Exam  Vitals and nursing note reviewed.   Constitutional:       General: He is not in acute distress.     Appearance: Normal appearance. He is not ill-appearing, toxic-appearing or diaphoretic.   HENT:      Head: Normocephalic.      Nose: No nasal deformity, septal deviation or signs of injury.      Right Nostril: No epistaxis.      Left Nostril: Epistaxis present.      Right Sinus: No maxillary sinus tenderness or frontal sinus tenderness.      Left Sinus: No maxillary sinus tenderness or frontal sinus tenderness.      Mouth/Throat:      Mouth: Mucous membranes are moist.   Eyes:      Conjunctiva/sclera: Conjunctivae normal.      Pupils: Pupils are equal, round, and reactive to light.   Cardiovascular:      Rate and Rhythm: Normal rate and regular rhythm.      Pulses: Normal pulses.      Heart sounds: Normal heart sounds.   Pulmonary:      Effort: Pulmonary effort is normal.      Breath sounds: Normal breath sounds.   Abdominal:      General: Bowel sounds are normal.      Palpations: Abdomen is soft.   Musculoskeletal:         General: Normal range of motion.      Cervical back: Normal range of motion.   Skin:     General: Skin is warm and dry.      Capillary Refill: Capillary refill takes less than 2 seconds.   Neurological:      Mental Status: He is alert and oriented to person, place, and time.   Psychiatric:         Mood and Affect: Mood normal.         Behavior: Behavior normal.         Thought Content: Thought content normal.         Judgment: Judgment normal.         Procedures           ED Course  ED Course as of 23 " 0941   Wed May 03, 2023   1347 Clamp applied after examination to nasal bridge. He declines CT evaluation of facial bones. [HS]   1354 2 sprays of neosynephrine in left nare and clamp reapplied. Patient tolerated. Will reassess in 15 minutes.  [HS]   1416 Clamp removed and bleeding continues from left nare. Cotton balls saturated in neosynephrine and inserted in bilateral nares. Nasal clamp reapplied. [HS]   1431 Patient tolerating cotton balls and clamp. No gross bleeding through cotton ball and patient denies post nasal bleeding. Will leave packing and reassess in 15 minutes.  [HS]   1448 Neosynephrine cotton ball packing removed. Slow bleeding still noted from left nare. Pt declines rhino rocket. Will attempt cotton balls with TXA next. Patient agreeable to this plan. [HS]   1503 TXA saturated cotton balls inserted in bilateral nares with pressure held by patient to bridge of nose. Patient tolerating. [HS]   1538 No active bleeding noted presently. Will observe.  [HS]   1600 Bleeding resumed. Rhinorocket inserted into left nare. Patient tolerated well.  [HS]   1600 No bleeding at present, patient tolerating well. Discussed discharge instructions including antibiotics and follow up with Dr. Álvarez. He verbalizes understanding and is agreeable with plan. [HS]      ED Course User Index  [HS] Mónica Olsen, APRN                                           [facility]    Final diagnoses:   Epistaxis   Fall, initial encounter   Anticoagulated       ED Disposition  ED Disposition     ED Disposition   Discharge    Condition   Stable    Comment   --             [facility] [address] [place] [facility] [address] [phone]  Call on 5/4/2023  Call first thing in the morning for follow up         Medication List      New Prescriptions    amoxicillin-clavulanate 875-125 MG per tablet  Commonly known as: AUGMENTIN  Take 1 tablet by mouth 2 (Two) Times a Day for 7 days.           Where to Get Your  Medications      These medications were sent to [id number] - [place], [place] - [address] - [phone]  - [phone]   [address], [place] [address]    Phone: [phone]   · amoxicillin-clavulanate 875-125 MG per tablet          Mónica Olsen, APRN  05/04/23 0994

## 2023-05-03 NOTE — DISCHARGE INSTRUCTIONS
Home to rest. Do not remove packing. May change dressing and tape as needed. Do not bend over, lift heavy objects, or drink hot liquids until follow up. Continue home medications as prescribed. Follow up with Dr. Álvarez with ENT, call number provided in the morning to schedule. Return if bleeding resumes before follow up.

## 2023-05-08 ENCOUNTER — OFFICE VISIT (OUTPATIENT)
Dept: OTOLARYNGOLOGY | Facility: CLINIC | Age: 68
End: 2023-05-08
Payer: MEDICARE

## 2023-05-08 VITALS — HEART RATE: 77 BPM | HEIGHT: 71 IN | WEIGHT: 315 LBS | BODY MASS INDEX: 44.1 KG/M2 | OXYGEN SATURATION: 96 %

## 2023-05-08 DIAGNOSIS — Z79.02 ANTIPLATELET OR ANTITHROMBOTIC LONG-TERM USE: ICD-10-CM

## 2023-05-08 DIAGNOSIS — R04.0 NASAL BLEEDING: Primary | ICD-10-CM

## 2023-05-10 NOTE — PROGRESS NOTES
Subjective   Aramis Alvarez is a 67 y.o. male.       History of Present Illness     Patient on chronic antiplatelet therapy consisting of Plavix and full-strength aspirin had a left-sided nosebleed.  This required packing in the emergency department.  He says even though he was not told to do so he has been holding his Plavix.  Packing was placed 5/3/2023.    The following portions of the patient's history were reviewed and updated as appropriate: allergies, current medications, past family history, past medical history, past social history, past surgical history and problem list.     reports that he quit smoking about 11 years ago. His smoking use included cigarettes. He has a 90.00 pack-year smoking history. He has never used smokeless tobacco. He reports that he does not drink alcohol and does not use drugs.   Patient is not a tobacco user and has not been counseled for use of tobacco products      Review of Systems        Objective   Physical Exam  Left-sided nasal tampon in place with no active bleeding.  This is removed.  Braxton-Synephrine and Xylocaine were instilled in the nares.  Patient was observed for an additional 10 minutes and had no evidence of recurrent bleeding.         Assessment and Plan   Diagnoses and all orders for this visit:    1. Nasal bleeding (Primary)    2. Antiplatelet or antithrombotic long-term use             Plan: Packing removed as described above.  Advise using nasal saline spray 2-3 times a day.  Resume antiplatelet therapy tomorrow.  Follow-up with me as needed.

## 2023-07-27 RX ORDER — CLOPIDOGREL BISULFATE 75 MG/1
TABLET ORAL
Qty: 90 TABLET | Refills: 3 | Status: SHIPPED | OUTPATIENT
Start: 2023-07-27

## 2023-08-28 ENCOUNTER — OFFICE VISIT (OUTPATIENT)
Dept: CARDIOLOGY | Facility: CLINIC | Age: 68
End: 2023-08-28
Payer: MEDICARE

## 2023-08-28 VITALS
HEIGHT: 72 IN | HEART RATE: 71 BPM | SYSTOLIC BLOOD PRESSURE: 160 MMHG | DIASTOLIC BLOOD PRESSURE: 72 MMHG | TEMPERATURE: 98.7 F | BODY MASS INDEX: 42.66 KG/M2 | OXYGEN SATURATION: 95 % | WEIGHT: 315 LBS

## 2023-08-28 DIAGNOSIS — E78.2 MIXED DIABETIC HYPERLIPIDEMIA ASSOCIATED WITH TYPE 2 DIABETES MELLITUS: ICD-10-CM

## 2023-08-28 DIAGNOSIS — I15.2 HYPERTENSION ASSOCIATED WITH DIABETES: ICD-10-CM

## 2023-08-28 DIAGNOSIS — E11.69 MIXED DIABETIC HYPERLIPIDEMIA ASSOCIATED WITH TYPE 2 DIABETES MELLITUS: ICD-10-CM

## 2023-08-28 DIAGNOSIS — E11.59 HYPERTENSION ASSOCIATED WITH DIABETES: ICD-10-CM

## 2023-08-28 DIAGNOSIS — I25.10 ATHEROSCLEROSIS OF NATIVE CORONARY ARTERY OF NATIVE HEART WITHOUT ANGINA PECTORIS: Primary | ICD-10-CM

## 2023-08-28 DIAGNOSIS — E66.01 CLASS 3 SEVERE OBESITY DUE TO EXCESS CALORIES WITH SERIOUS COMORBIDITY AND BODY MASS INDEX (BMI) OF 45.0 TO 49.9 IN ADULT: ICD-10-CM

## 2023-08-28 PROCEDURE — 99214 OFFICE O/P EST MOD 30 MIN: CPT | Performed by: INTERNAL MEDICINE

## 2023-08-28 PROCEDURE — 3078F DIAST BP <80 MM HG: CPT | Performed by: INTERNAL MEDICINE

## 2023-08-28 PROCEDURE — 1159F MED LIST DOCD IN RCRD: CPT | Performed by: INTERNAL MEDICINE

## 2023-08-28 PROCEDURE — 3077F SYST BP >= 140 MM HG: CPT | Performed by: INTERNAL MEDICINE

## 2023-08-28 PROCEDURE — 1160F RVW MEDS BY RX/DR IN RCRD: CPT | Performed by: INTERNAL MEDICINE

## 2023-08-28 NOTE — PROGRESS NOTES
Aramis Alvarez  67 y.o. male      1. Atherosclerosis of native coronary artery of native heart without angina pectoris    2. Class 3 severe obesity due to excess calories with serious comorbidity and body mass index (BMI) of 45.0 to 49.9 in adult    3. Hypertension associated with diabetes    4. Mixed diabetic hyperlipidemia associated with type 2 diabetes mellitus        History of Present Illness  Mr. Alvarez is here for follow-up of his above-stated problems. He has multiple medical issues including history of coronary artery disease, morbid obesity, hypertension, hyperlipidemia, diabetes mellitus and CKD.    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  Left Circumflex Coronary Artery and ramus intermedius had noncritical disease.    Echocardiogram in October 2019 showed normal LV systolic function with an EF of 56% with mild to moderate LVH.  There was moderate enlargement of the right ventricle and moderate aortic root dilatation of 4.5 cm.  No significant valve abnormalities noted.    He denied any cardiac symptoms and has been compliant with his medication. No signs of CHF was noted.  He is able to perform his activities of daily living.  He did complain of easy bruising.    Allergies   Allergen Reactions    Atorvastatin Other (See Comments)     Leg cramps/muscle cramps         Past Medical History:   Diagnosis Date    Coronary atherosclerosis of native coronary artery     Diabetes mellitus     Hyperlipidemia     Hypertension     Obesity     Old myocardial infarction          Past Surgical History:   Procedure Laterality Date    CARDIAC SURGERY      pt states he had one stent put in         Family History   Problem Relation Age of Onset    Depression Mother     Diabetes Mother     Drug abuse Mother     Heart disease Mother     Hyperlipidemia Mother      "Alcohol abuse Father     Cancer Father     COPD Father     Hypertension Father     Early death Brother          Social History     Socioeconomic History    Marital status:    Tobacco Use    Smoking status: Former     Packs/day: 2.00     Years: 45.00     Pack years: 90.00     Types: Cigarettes     Quit date: 2011     Years since quittin.2    Smokeless tobacco: Never   Substance and Sexual Activity    Alcohol use: No    Drug use: No    Sexual activity: Defer         Current Outpatient Medications   Medication Sig Dispense Refill    bumetanide (BUMEX) 2 MG tablet Take 0.5 tablets by mouth Daily. 45 tablet 3    carvedilol (COREG) 6.25 MG tablet Take 1 tablet by mouth Daily. 90 tablet 3    clopidogrel (PLAVIX) 75 MG tablet Take 1 tablet by mouth once daily 90 tablet 3    dapagliflozin-metformin HCl ER (XIGDUO XR) 5-1000 MG tablet Take 2 tablets by mouth Daily With Breakfast. 60 tablet 11    glimepiride (AMARYL) 1 MG tablet   1    isosorbide mononitrate (IMDUR) 30 MG 24 hr tablet Take 1 tablet by mouth Daily. 90 tablet 3    losartan (COZAAR) 100 MG tablet Take 1 tablet by mouth Daily. 90 tablet 3    magnesium oxide (MAG-OX) 400 MG tablet Take 1 tablet by mouth Daily. 90 tablet 3    Magnesium Oxide -Mg Supplement (MAGnesium-Oxide) 400 (240 Mg) MG tablet Take 1 tablet by mouth once daily 90 tablet 3    metFORMIN (GLUCOPHAGE) 1000 MG tablet   1    rosuvastatin (CRESTOR) 20 MG tablet Take 1 tablet by mouth Daily. 90 tablet 3    spironolactone (ALDACTONE) 25 MG tablet Take 1 tablet by mouth Daily. 90 tablet 3    vitamin D (ERGOCALCIFEROL) 1.25 MG (92570 UT) capsule capsule Take 1 capsule by mouth 1 (One) Time Per Week.       No current facility-administered medications for this visit.         OBJECTIVE    /72 (BP Location: Left arm, Patient Position: Sitting, Cuff Size: Adult)   Pulse 71   Temp 98.7 øF (37.1 øC)   Ht 182.9 cm (72\")   Wt (!) 155 kg (342 lb)   SpO2 95%   BMI 46.38 kg/mý "         Review of Systems : The following systems were reviewed and no changes noted    Constitutional:  Denies recent weight loss, weight gain, fever or chills     HENT:  Denies any hearing loss, epistaxis, hoarseness, or difficulty speaking.     Eyes: Wears eyeglasses or contact lenses     Respiratory: no cough, wheezing, or hemoptysis.     Cardiovascular: Negative for palpations, chest pain, orthopnea, PND.      Gastrointestinal:  Denies change in bowel habits, dyspepsia, ulcer disease, hematochezia, or melena.     Endocrine: Negative for cold intolerance, heat intolerance, polydipsia, polyphagia and polyuria.    Genitourinary: CKD      Musculoskeletal: Denies any history of arthritic symptoms or back problems     Neurological:  Denies any history of recurrent headaches, strokes, TIA, or seizure disorder.     Hematological: Denies any food allergies, seasonal allergies, bleeding disorders, or lymphadenopathy.     Psychiatric/Behavioral: Denies any history of depression, substance abuse, or change in cognitive function.       Physical Exam : The following systems were reviewed and changes noted as indicated below    Constitutional: Cooperative, alert and oriented,  in no acute distress. Morbidly obese.  BMI 46.4    HENT:   Head: Normocephalic, normal hair patterns, no masses or tenderness.  Ears, Nose, and Throat: No gross abnormalities. No pallor or cyanosis.   Eyes: EOMS intact, PERRL, conjunctivae and lids unremarkable. Fundoscopic exam and visual fields not performed.   Neck: No palpable masses or adenopathy, no thyromegaly, no JVD, carotid pulses are full and equal bilaterally and without  Bruits.     Cardiovascular: Regular rhythm, S1 and S2 normal, no S3 or S4.No murmurs, gallops, or rubs detected.     Pulmonary/Chest: Chest: normal symmetry,  normal respiratory excursion, no intercostal retraction, no use of accessory muscles.            Pulmonary: Normal breath sounds. No rales or ronchi.    Abdominal:  Abdomen soft, bowel sounds normoactive, no masses, no hepatosplenomegaly, non-tender, no bruits.     Musculoskeletal: No deformities, clubbing, cyanosis, erythema. 1+ pitting edema    Neurological: No gross motor or sensory deficits noted, affect appropriate, oriented to time, person, place.     Skin: Bruising noted in the upper extremities    Psychiatric: He has a normal mood and affect. His behavior is normal. Judgment and thought content normal.         Procedures      Lab Results   Component Value Date    WBC 6.2 10/05/2022    HGB 13.9 (L) 03/02/2023    HCT 42.8 03/02/2023    MCV 93.3 10/05/2022     10/05/2022     Lab Results   Component Value Date    GLUCOSE 182 (H) 03/08/2022    BUN 17 07/27/2023    CREATININE 1.4 (H) 07/27/2023    EGFRIFNONA 65 12/07/2021    BCR 15.2 12/07/2021    CO2 27 07/27/2023    CALCIUM 10.0 07/27/2023    ALBUMIN 4.0 07/27/2023    AST 78 (H) 05/16/2017    ALT 61 05/16/2017     No results found for: CHOL  Lab Results   Component Value Date    TRIG 301 (H) 10/05/2022    TRIG 191 09/20/2021     Lab Results   Component Value Date    HDL 34 (L) 10/05/2022    HDL 32 09/20/2021     No components found for: LDLCALC  Lab Results   Component Value Date    LDL 40 10/05/2022    LDL 37 09/20/2021     No results found for: HDLLDLRATIO  No components found for: CHOLHDL  Lab Results   Component Value Date    HGBA1C 6.2 04/20/2023     No results found for: TSH, W5XNKAQ, S7TQUAD, THYROIDAB        ASSESSMENT AND PLAN  Mr. Alvarez has multiple medical issues as discussed under history of present illness.  No clinical findings to suggest angina, arrhythmia or congestive heart failure noted.    He is a management challenge secondary to morbid obesity and he does not watch his diet.    I have continued bumetanide 1 mg daily along with Aldactone.  Antiplatelet therapy with aspirin and Plavix and management of hypertension with Coreg, losartan and antianginal therapy with isosorbide mononitrate has been  continued.  Lipid-lowering therapy with Crestor has been continued.      He had lab work done in October 2022 and LDL was 40 and HDL 34.  Triglycerides were 301.  He had lab work done in July 2023 and the results were reviewed.  Electrolytes were in the normal range and creatinine was 1.4.  He has been advised to maintain a high fluid intake.    Diagnoses and all orders for this visit:    1. Atherosclerosis of native coronary artery of native heart without angina pectoris (Primary)  -     ECG 12 Lead    2. Class 3 severe obesity due to excess calories with serious comorbidity and body mass index (BMI) of 45.0 to 49.9 in adult    3. Hypertension associated with diabetes    4. Mixed diabetic hyperlipidemia associated with type 2 diabetes mellitus        Patient's Body mass index is 46.38 kg/mý. BMI is above normal parameters. Recommendations include: exercise counseling and nutrition counseling.  Patient is a non-smoker    Robinson Fierro MD  8/28/2023  13:35 CDT